# Patient Record
Sex: FEMALE | Race: WHITE | NOT HISPANIC OR LATINO | URBAN - NONMETROPOLITAN AREA
[De-identification: names, ages, dates, MRNs, and addresses within clinical notes are randomized per-mention and may not be internally consistent; named-entity substitution may affect disease eponyms.]

---

## 2020-07-01 ENCOUNTER — OFFICE (OUTPATIENT)
Dept: URBAN - NONMETROPOLITAN AREA CLINIC 13 | Facility: CLINIC | Age: 30
End: 2020-07-01

## 2020-07-01 VITALS
HEIGHT: 65 IN | SYSTOLIC BLOOD PRESSURE: 126 MMHG | OXYGEN SATURATION: 98 % | DIASTOLIC BLOOD PRESSURE: 81 MMHG | RESPIRATION RATE: 18 BRPM | HEART RATE: 53 BPM | WEIGHT: 149 LBS

## 2020-07-01 DIAGNOSIS — K59.00 CONSTIPATION, UNSPECIFIED: ICD-10-CM

## 2020-07-01 PROCEDURE — 99203 OFFICE O/P NEW LOW 30 MIN: CPT | Performed by: NURSE PRACTITIONER

## 2020-07-01 RX ORDER — LINACLOTIDE 72 UG/1
CAPSULE, GELATIN COATED ORAL
Qty: 90 | Refills: 1 | Status: COMPLETED
Start: 2020-07-01 | End: 2020-07-23

## 2022-11-28 ENCOUNTER — TELEPHONE (OUTPATIENT)
Dept: OBSTETRICS AND GYNECOLOGY | Facility: CLINIC | Age: 32
End: 2022-11-28

## 2022-12-21 ENCOUNTER — INITIAL PRENATAL (OUTPATIENT)
Dept: OBSTETRICS AND GYNECOLOGY | Facility: CLINIC | Age: 32
End: 2022-12-21

## 2022-12-21 VITALS
BODY MASS INDEX: 28.99 KG/M2 | SYSTOLIC BLOOD PRESSURE: 120 MMHG | DIASTOLIC BLOOD PRESSURE: 70 MMHG | HEIGHT: 65 IN | WEIGHT: 174 LBS

## 2022-12-21 DIAGNOSIS — O26.899 PREGNANCY RELATED NAUSEA, ANTEPARTUM: ICD-10-CM

## 2022-12-21 DIAGNOSIS — Z78.9 NON-SMOKER: ICD-10-CM

## 2022-12-21 DIAGNOSIS — Z34.01 PRENATAL CARE, FIRST PREGNANCY, FIRST TRIMESTER: Primary | ICD-10-CM

## 2022-12-21 DIAGNOSIS — Z71.85 IMMUNIZATION COUNSELING: ICD-10-CM

## 2022-12-21 DIAGNOSIS — R11.0 PREGNANCY RELATED NAUSEA, ANTEPARTUM: ICD-10-CM

## 2022-12-21 PROBLEM — Z34.00 PRIMIGRAVIDA: Status: ACTIVE | Noted: 2022-12-21

## 2022-12-21 PROCEDURE — 0501F PRENATAL FLOW SHEET: CPT | Performed by: ADVANCED PRACTICE MIDWIFE

## 2022-12-21 RX ORDER — SERTRALINE HYDROCHLORIDE 25 MG/1
25 TABLET, FILM COATED ORAL DAILY
COMMUNITY
End: 2023-02-14 | Stop reason: SDUPTHER

## 2022-12-21 RX ORDER — DOCUSATE SODIUM 250 MG
CAPSULE ORAL EVERY 24 HOURS
COMMUNITY

## 2022-12-21 RX ORDER — HYDROXYZINE HYDROCHLORIDE 10 MG/1
TABLET, FILM COATED ORAL
COMMUNITY

## 2022-12-21 RX ORDER — FOLIC ACID 1 MG/1
1000 TABLET ORAL EVERY MORNING
COMMUNITY
Start: 2022-12-13

## 2022-12-21 RX ORDER — PRENATAL WITH FERROUS FUM AND FOLIC ACID 3080; 920; 120; 400; 22; 1.84; 3; 20; 10; 1; 12; 200; 27; 25; 2 [IU]/1; [IU]/1; MG/1; [IU]/1; MG/1; MG/1; MG/1; MG/1; MG/1; MG/1; UG/1; MG/1; MG/1; MG/1; MG/1
TABLET ORAL EVERY 24 HOURS
COMMUNITY

## 2022-12-21 RX ORDER — FOLIC ACID 0.8 MG
TABLET ORAL
COMMUNITY

## 2022-12-21 RX ORDER — ONDANSETRON HYDROCHLORIDE 8 MG/1
TABLET, FILM COATED ORAL
COMMUNITY
Start: 2022-12-13 | End: 2023-01-20

## 2022-12-29 LAB
ABO GROUP BLD: NORMAL
BACTERIA UR CULT: NORMAL
BACTERIA UR CULT: NORMAL
BASOPHILS # BLD AUTO: 0.02 10*3/MM3 (ref 0–0.2)
BASOPHILS NFR BLD AUTO: 0.4 % (ref 0–1.5)
BLD GP AB SCN SERPL QL: NEGATIVE
C TRACH RRNA SPEC QL NAA+PROBE: NEGATIVE
DRUGS UR: NORMAL
EOSINOPHIL # BLD AUTO: 0.07 10*3/MM3 (ref 0–0.4)
EOSINOPHIL NFR BLD AUTO: 1.3 % (ref 0.3–6.2)
ERYTHROCYTE [DISTWIDTH] IN BLOOD BY AUTOMATED COUNT: 13.5 % (ref 12.3–15.4)
HBV SURFACE AG SERPL QL IA: NEGATIVE
HCT VFR BLD AUTO: 37 % (ref 34–46.6)
HCV AB S/CO SERPL IA: <0.1 S/CO RATIO (ref 0–0.9)
HGB BLD-MCNC: 12.2 G/DL (ref 12–15.9)
HIV 1+2 AB+HIV1 P24 AG SERPL QL IA: NON REACTIVE
IMM GRANULOCYTES # BLD AUTO: 0.03 10*3/MM3 (ref 0–0.05)
IMM GRANULOCYTES NFR BLD AUTO: 0.5 % (ref 0–0.5)
LYMPHOCYTES # BLD AUTO: 1.46 10*3/MM3 (ref 0.7–3.1)
LYMPHOCYTES NFR BLD AUTO: 26.4 % (ref 19.6–45.3)
MCH RBC QN AUTO: 29.1 PG (ref 26.6–33)
MCHC RBC AUTO-ENTMCNC: 33 G/DL (ref 31.5–35.7)
MCV RBC AUTO: 88.3 FL (ref 79–97)
MONOCYTES # BLD AUTO: 0.38 10*3/MM3 (ref 0.1–0.9)
MONOCYTES NFR BLD AUTO: 6.9 % (ref 5–12)
N GONORRHOEA RRNA SPEC QL NAA+PROBE: NEGATIVE
NEUTROPHILS # BLD AUTO: 3.57 10*3/MM3 (ref 1.7–7)
NEUTROPHILS NFR BLD AUTO: 64.5 % (ref 42.7–76)
NRBC BLD AUTO-RTO: 0.2 /100 WBC (ref 0–0.2)
PLATELET # BLD AUTO: 239 10*3/MM3 (ref 140–450)
RBC # BLD AUTO: 4.19 10*6/MM3 (ref 3.77–5.28)
RH BLD: POSITIVE
RPR SER QL: NON REACTIVE
RUBV IGG SERPL IA-ACNC: 3.24 INDEX
WBC # BLD AUTO: 5.53 10*3/MM3 (ref 3.4–10.8)

## 2023-01-20 ENCOUNTER — ROUTINE PRENATAL (OUTPATIENT)
Dept: OBSTETRICS AND GYNECOLOGY | Facility: CLINIC | Age: 33
End: 2023-01-20
Payer: COMMERCIAL

## 2023-01-20 VITALS — SYSTOLIC BLOOD PRESSURE: 132 MMHG | DIASTOLIC BLOOD PRESSURE: 80 MMHG | WEIGHT: 172 LBS | BODY MASS INDEX: 28.62 KG/M2

## 2023-01-20 DIAGNOSIS — Z34.02 ENCOUNTER FOR SUPERVISION OF NORMAL FIRST PREGNANCY IN SECOND TRIMESTER: Primary | ICD-10-CM

## 2023-01-20 PROCEDURE — 0502F SUBSEQUENT PRENATAL CARE: CPT | Performed by: OBSTETRICS & GYNECOLOGY

## 2023-01-20 RX ORDER — METOCLOPRAMIDE 10 MG/1
10 TABLET ORAL 3 TIMES DAILY PRN
Qty: 30 TABLET | Refills: 2 | Status: SHIPPED | OUTPATIENT
Start: 2023-01-20

## 2023-01-20 NOTE — PROGRESS NOTES
Feeling well, no nausea but having daily headaches  Reviewed normal prenatal labs  Rx Reglan for headaches  Discussed ffDNA and maternal carrier screening    Diagnoses and all orders for this visit:    1. Encounter for supervision of normal first pregnancy in second trimester (Primary)  -     metoclopramide (Reglan) 10 MG tablet; Take 1 tablet by mouth 3 (Three) Times a Day As Needed (nausea or headache).  Dispense: 30 tablet; Refill: 2

## 2023-02-02 ENCOUNTER — TELEPHONE (OUTPATIENT)
Dept: OBSTETRICS AND GYNECOLOGY | Facility: CLINIC | Age: 33
End: 2023-02-02
Payer: COMMERCIAL

## 2023-02-02 NOTE — TELEPHONE ENCOUNTER
Pt left a VM on the nurse line that she was calling for advice d/t numbness on right side of her body.  Pt reports numbness in face, hand and down to foot on right side.  Attempted to call pt but no answer.  I did leave a VM for pt to go to ER and return my call to the office.

## 2023-02-14 ENCOUNTER — ROUTINE PRENATAL (OUTPATIENT)
Dept: OBSTETRICS AND GYNECOLOGY | Facility: CLINIC | Age: 33
End: 2023-02-14
Payer: COMMERCIAL

## 2023-02-14 VITALS — DIASTOLIC BLOOD PRESSURE: 70 MMHG | SYSTOLIC BLOOD PRESSURE: 116 MMHG | BODY MASS INDEX: 28.62 KG/M2 | WEIGHT: 172 LBS

## 2023-02-14 DIAGNOSIS — Z78.9 NON-SMOKER: ICD-10-CM

## 2023-02-14 DIAGNOSIS — O99.340 ANXIETY IN PREGNANCY, ANTEPARTUM: ICD-10-CM

## 2023-02-14 DIAGNOSIS — F41.9 ANXIETY IN PREGNANCY, ANTEPARTUM: ICD-10-CM

## 2023-02-14 DIAGNOSIS — O26.899 HEADACHE IN PREGNANCY, ANTEPARTUM: ICD-10-CM

## 2023-02-14 DIAGNOSIS — R51.9 HEADACHE IN PREGNANCY, ANTEPARTUM: ICD-10-CM

## 2023-02-14 DIAGNOSIS — Z34.02 PRENATAL CARE, FIRST PREGNANCY, SECOND TRIMESTER: Primary | ICD-10-CM

## 2023-02-14 LAB
GLUCOSE UR STRIP-MCNC: NEGATIVE MG/DL
PROT UR STRIP-MCNC: NEGATIVE MG/DL

## 2023-02-14 PROCEDURE — 0502F SUBSEQUENT PRENATAL CARE: CPT | Performed by: ADVANCED PRACTICE MIDWIFE

## 2023-02-14 RX ORDER — PROMETHAZINE HYDROCHLORIDE 12.5 MG/1
1 TABLET ORAL
COMMUNITY
Start: 2023-02-02

## 2023-02-14 RX ORDER — ONDANSETRON HYDROCHLORIDE 8 MG/1
TABLET, FILM COATED ORAL
COMMUNITY
Start: 2023-02-02

## 2023-02-14 RX ORDER — SERTRALINE HYDROCHLORIDE 25 MG/1
25 TABLET, FILM COATED ORAL DAILY
Qty: 30 TABLET | Refills: 6 | Status: SHIPPED | OUTPATIENT
Start: 2023-02-14

## 2023-02-14 RX ORDER — CHOLECALCIFEROL (VITD3)/VIT K2 137.5-2
1 TABLET ORAL EVERY MORNING
COMMUNITY
Start: 2023-02-02

## 2023-02-14 NOTE — PROGRESS NOTES
Reason for visit: Routine OB visit at 15w6d     CC:  32-yr old  here for routine OBV at 15w6d.  HUAN 2023, by Ultrasound.  Patient reports she has had improvement with the nausea, but she is noticing more headaches. Denies VB, LOF, contractions, or other concerns. Also denies h/a, visual disturbances, and epigastric pain.     ROS: All systems reviewed and are negative with exception of the following: migraines, anxious    Wt 172 lb for a TWG of -0.907 kg (-2 lb), /70, FHTs 150  Urine today and reviewed: negative glucose, negative protein    20-week Anatomy Scan: scheduled for 2023    Exam:  General Appearance:  Healthy appearing . Normal mood and behavior.  HEENT: NCAT, EOMI  HR str and reg. Lungs clear. Resp even and unlabored.  Abdomen is soft and nontender. Bowel sounds active. Uterus is consistent with EGA  Ext: no edema, nontender, no trauma, or cyanosis.    Impression  Diagnoses and all orders for this visit:    1. Prenatal care, first pregnancy, second trimester (Primary)  - Discussed second trimester of pregnancy, discomforts and measures of support, fetal movement, pelvic pain warnings, bleeding warnings, and signs and symptoms to report. Second Trimester of Pregnancy video included in the AVS. Round Ligament Pain education included in the AVS.  - Discussed ffDNA and carrier screening results with patient.  - Discussed and encouraged to call or come to the hospital with vaginal bleeding, leaking of fluid, pelvic pain, or any other concerns.  - Return to the office in 4 weeks for a routine OB visit with Dr. Adams and as needed with concerns.    2. Anxiety in pregnancy, antepartum  - Anxiety has been managed with Zoloft and patient in need of new prescription. Has a healthy support system.   -     sertraline (ZOLOFT) 25 MG tablet; Take 1 tablet by mouth Daily.  Dispense: 30 tablet; Refill: 6    3. Headache in pregnancy, antepartum  - Discussed measures of support for headaches,  including Tylenol, magnesium, continuing Reglan, massage, aromatherapy, heat/cold. Patient to notify provider if headaches persist or increase.     4. Non-smoker          This note has been signed electronically.    Tayler Chahal DNP, APRN, CNM, RNC-OB

## 2023-03-14 ENCOUNTER — ROUTINE PRENATAL (OUTPATIENT)
Dept: OBSTETRICS AND GYNECOLOGY | Facility: CLINIC | Age: 33
End: 2023-03-14
Payer: COMMERCIAL

## 2023-03-14 VITALS — WEIGHT: 179 LBS | DIASTOLIC BLOOD PRESSURE: 72 MMHG | SYSTOLIC BLOOD PRESSURE: 120 MMHG | BODY MASS INDEX: 29.79 KG/M2

## 2023-03-14 DIAGNOSIS — O26.892: Primary | ICD-10-CM

## 2023-03-14 DIAGNOSIS — R51.9: Primary | ICD-10-CM

## 2023-03-14 LAB
GLUCOSE UR STRIP-MCNC: NEGATIVE MG/DL
PROT UR STRIP-MCNC: NEGATIVE MG/DL

## 2023-03-14 PROCEDURE — 0502F SUBSEQUENT PRENATAL CARE: CPT | Performed by: OBSTETRICS & GYNECOLOGY

## 2023-03-14 RX ORDER — LABETALOL 100 MG/1
100 TABLET, FILM COATED ORAL 2 TIMES DAILY
Qty: 30 TABLET | Refills: 2 | Status: SHIPPED | OUTPATIENT
Start: 2023-03-14

## 2023-03-14 NOTE — PROGRESS NOTES
Still having migraine headaches, every 1-2 days  Reviewed normal prenatal labs  Rx Labetalol 100mg BID for migraine prophylaxis  Has anatomy US later today    Diagnoses and all orders for this visit:    1. Pregnancy headache, antepartum, second trimester (Primary)

## 2023-03-17 RX ORDER — BUPROPION HYDROCHLORIDE 150 MG/1
150 TABLET ORAL EVERY MORNING
Qty: 30 TABLET | Refills: 2 | Status: SHIPPED | OUTPATIENT
Start: 2023-03-17 | End: 2024-03-16

## 2023-03-17 NOTE — TELEPHONE ENCOUNTER
Pharmacy has sent a paper request to refill Wellbutrin XL 150mg once daily. Per last my chart message with darby Lester to take wellbutrin and zoloft together. See pended refills. Pt is 20w2d.

## 2023-04-11 ENCOUNTER — ROUTINE PRENATAL (OUTPATIENT)
Dept: OBSTETRICS AND GYNECOLOGY | Facility: CLINIC | Age: 33
End: 2023-04-11
Payer: COMMERCIAL

## 2023-04-11 VITALS — DIASTOLIC BLOOD PRESSURE: 80 MMHG | BODY MASS INDEX: 29.62 KG/M2 | WEIGHT: 178 LBS | SYSTOLIC BLOOD PRESSURE: 122 MMHG

## 2023-04-11 DIAGNOSIS — O99.613 CONSTIPATION DURING PREGNANCY IN THIRD TRIMESTER: ICD-10-CM

## 2023-04-11 DIAGNOSIS — Z78.9 NON-SMOKER: ICD-10-CM

## 2023-04-11 DIAGNOSIS — K59.00 CONSTIPATION DURING PREGNANCY IN THIRD TRIMESTER: ICD-10-CM

## 2023-04-11 DIAGNOSIS — Z3A.23 23 WEEKS GESTATION OF PREGNANCY: ICD-10-CM

## 2023-04-11 DIAGNOSIS — Z34.02 PRIMIGRAVIDA, SECOND TRIMESTER: Primary | ICD-10-CM

## 2023-04-11 PROBLEM — O35.8XX0 UMBILICAL VEIN ABNORMALITY AFFECTING PREGNANCY: Status: ACTIVE | Noted: 2023-03-14

## 2023-04-11 LAB
GLUCOSE UR STRIP-MCNC: NEGATIVE MG/DL
PROT UR STRIP-MCNC: NEGATIVE MG/DL

## 2023-04-11 PROCEDURE — 0502F SUBSEQUENT PRENATAL CARE: CPT | Performed by: ADVANCED PRACTICE MIDWIFE

## 2023-04-11 RX ORDER — POLYETHYLENE GLYCOL 3350 17 G/17G
17 POWDER, FOR SOLUTION ORAL DAILY
COMMUNITY

## 2023-04-11 NOTE — PROGRESS NOTES
Reason for visit: Routine OB visit at 23w6d. She is accompanied by her mother    CC:  Patient reports feeling fetal movement. She has not had headaches since starting a regiment of Vitamin B12, Magnesium, and CoQ10. She has been having some mild cramping, but she believes this is from her chronic constipation. Denies VB, LOF, contractions, h/a, visual changes, and right upper quadrant pain.     ROS: All systems reviewed and are negative with exception of the following: constipation    Wt 178 lb for a TWG of 1.814 kg (4 lb), /80, FHTs 137  Urine today and reviewed: negative glucose, negative protein    19-weeks Anatomy scan: normal; anterior placenta without evidence of placenta previa    Exam:  General Appearance:  Healthy appearing . Normal mood and behavior.  HEENT: NCAT, EOMI  HR str and reg. Lungs clear. Resp even and unlabored.  Abdomen is soft and nontender. Uterus is consistent with EGA  Ext: no edema, nontender, no trauma, or cyanosis.    Impression  Diagnoses and all orders for this visit:    1. Primigravida, second trimester (Primary)  - Discussed second trimester of pregnancy, discomforts and measures of support, fetal movement,  labor warnings, preeclampsia warnings, and signs and symptoms to report. Second Trimester of Pregnancy video and Round Ligament Pain education included in the AVS.  - Discussed plan for glucose tolerance test and hemoglobin for the next visit.  - Discussed and encouraged to call or come to the hospital for vaginal bleeding, leaking of fluid, contractions, or any other concerns.  - Return to the office in four weeks with Dr. Adams with 4D ultrasound and as needed with concerns.    2. 23 weeks gestation of pregnancy    3. Constipation during pregnancy in third trimester  - Discussed measures of support, including increased hydration with water, increased fiber intake including fruits and vegetables, OTC options such as Metamucil or Benefiber and Colace.  Constipation education included in the AVS. Patient to notify provider if symptoms increase or persist.     4. Non-smoker          This note has been signed electronically.    Tayler Chahal DNP, APRN, CNM, RNC-OB

## 2023-04-14 ENCOUNTER — TELEPHONE (OUTPATIENT)
Dept: OBSTETRICS AND GYNECOLOGY | Facility: CLINIC | Age: 33
End: 2023-04-14
Payer: COMMERCIAL

## 2023-04-14 NOTE — TELEPHONE ENCOUNTER
Called and informed pt that she needs to d/c Reglan.  I called and spoke to Guzman and informed her of this, she voiced understanding.

## 2023-04-14 NOTE — TELEPHONE ENCOUNTER
Guzman from [facility] called and said there is a drug-drug interaction with Reglan and Wellbutrin that is EPS effects, serotonin syndome and dyskinesia.  Do you want pt to continue on both or d/c Reglan if she wants to stay on Wellbutrin?

## 2023-05-11 ENCOUNTER — ROUTINE PRENATAL (OUTPATIENT)
Dept: OBSTETRICS AND GYNECOLOGY | Facility: CLINIC | Age: 33
End: 2023-05-11
Payer: COMMERCIAL

## 2023-05-11 VITALS — SYSTOLIC BLOOD PRESSURE: 108 MMHG | DIASTOLIC BLOOD PRESSURE: 64 MMHG | BODY MASS INDEX: 30.62 KG/M2 | WEIGHT: 184 LBS

## 2023-05-11 DIAGNOSIS — Z34.03 ENCOUNTER FOR SUPERVISION OF NORMAL FIRST PREGNANCY IN THIRD TRIMESTER: Primary | ICD-10-CM

## 2023-05-11 LAB
GLUCOSE UR STRIP-MCNC: NEGATIVE MG/DL
PROT UR STRIP-MCNC: NEGATIVE MG/DL

## 2023-05-11 NOTE — PROGRESS NOTES
Good fetal movement  Reviewed normal anatomy ultrasound  Glucola and Hgb today, Rh positive  Discuss Tdap next visit  Discussed Nick Pagan contractions    Diagnoses and all orders for this visit:    1. Encounter for supervision of normal first pregnancy in third trimester (Primary)  -     Gestational Screen 1 Hr ([facility])  -     Hemoglobin

## 2023-05-12 LAB
GLUCOSE 1H P 50 G GLC PO SERPL-MCNC: 130 MG/DL (ref 65–139)
HGB BLD-MCNC: 11.6 G/DL (ref 12–15.9)

## 2023-05-24 ENCOUNTER — HOSPITAL ENCOUNTER (OUTPATIENT)
Facility: HOSPITAL | Age: 33
Discharge: HOME | End: 2023-05-24
Attending: OBSTETRICS & GYNECOLOGY | Admitting: OBSTETRICS & GYNECOLOGY
Payer: COMMERCIAL

## 2023-05-24 ENCOUNTER — TELEPHONE (OUTPATIENT)
Dept: OBSTETRICS AND GYNECOLOGY | Facility: CLINIC | Age: 33
End: 2023-05-24
Payer: COMMERCIAL

## 2023-05-24 VITALS
BODY MASS INDEX: 31.16 KG/M2 | RESPIRATION RATE: 16 BRPM | SYSTOLIC BLOOD PRESSURE: 112 MMHG | DIASTOLIC BLOOD PRESSURE: 68 MMHG | OXYGEN SATURATION: 99 % | TEMPERATURE: 97.8 F | WEIGHT: 187 LBS | HEIGHT: 65 IN | HEART RATE: 69 BPM

## 2023-05-24 PROBLEM — Z34.90 PREGNANCY: Status: ACTIVE | Noted: 2023-05-24

## 2023-05-24 PROCEDURE — G0463 HOSPITAL OUTPT CLINIC VISIT: HCPCS

## 2023-05-24 PROCEDURE — G0378 HOSPITAL OBSERVATION PER HR: HCPCS

## 2023-05-24 RX ORDER — FAMOTIDINE 10 MG
10 TABLET ORAL 2 TIMES DAILY
COMMUNITY

## 2023-05-24 NOTE — TELEPHONE ENCOUNTER
----- Message from Megan URIOSTEGUI sent at 5/24/2023 10:36 AM CDT -----  Regarding: Not feeling baby move  Contact: [phone]  My baby is usually super active from about 6am on and I haven’t felt him at all today and am super worried. I have an appt tomorrow but wondering what I need to do?

## 2023-05-24 NOTE — TELEPHONE ENCOUNTER
I called patient to discuss baby's movement further. Pt reports she has eaten her normal breakfast and has eaten candy, laid on her left side and pushed around on her belly to elicit movement but has not felt anything. Spoke with Dr Norris's nurse about his preference to bring patient to office or send to [facility]. Pt advised to go to LDR for evaluation and voiced understanding.

## 2023-05-25 ENCOUNTER — ROUTINE PRENATAL (OUTPATIENT)
Dept: OBSTETRICS AND GYNECOLOGY | Facility: CLINIC | Age: 33
End: 2023-05-25

## 2023-05-25 VITALS — WEIGHT: 186 LBS | DIASTOLIC BLOOD PRESSURE: 70 MMHG | SYSTOLIC BLOOD PRESSURE: 126 MMHG | BODY MASS INDEX: 30.95 KG/M2

## 2023-05-25 DIAGNOSIS — Z78.9 NON-SMOKER: ICD-10-CM

## 2023-05-25 DIAGNOSIS — Z23 NEED FOR TDAP VACCINATION: ICD-10-CM

## 2023-05-25 DIAGNOSIS — O35.8XX0 UMBILICAL VEIN ABNORMALITY AFFECTING PREGNANCY, SINGLE OR UNSPECIFIED FETUS: ICD-10-CM

## 2023-05-25 DIAGNOSIS — Z3A.30 30 WEEKS GESTATION OF PREGNANCY: ICD-10-CM

## 2023-05-25 DIAGNOSIS — Z71.85 IMMUNIZATION COUNSELING: ICD-10-CM

## 2023-05-25 DIAGNOSIS — Z34.03 PRIMIGRAVIDA, THIRD TRIMESTER: Primary | ICD-10-CM

## 2023-05-25 LAB
GLUCOSE UR STRIP-MCNC: NEGATIVE MG/DL
PROT UR STRIP-MCNC: NEGATIVE MG/DL

## 2023-05-25 PROCEDURE — 0502F SUBSEQUENT PRENATAL CARE: CPT | Performed by: ADVANCED PRACTICE MIDWIFE

## 2023-05-25 PROCEDURE — 90471 IMMUNIZATION ADMIN: CPT | Performed by: ADVANCED PRACTICE MIDWIFE

## 2023-05-25 PROCEDURE — 90715 TDAP VACCINE 7 YRS/> IM: CPT | Performed by: ADVANCED PRACTICE MIDWIFE

## 2023-05-25 NOTE — PROGRESS NOTES
Reason for visit: Routine OB visit at 30w1d     CC:  Patient reports she questions the amount of fetal movement. Yesterday, she was seen in LDR for decreased movement. Once she was on the monitor, they said the baby was moving regularly, but she does not feel it like they do. Denies VB, LOF, contractions, h/a, visual changes, and right upper quadrant pain.     ROS: All systems reviewed and are negative with exception of the following: anxious    Wt 186 lb for a TWG of 5.443 kg (12 lb), /70, FHTs 137, FH 30 cm  Urine today and reviewed: negative glucose, negative protein    19-week Anatomy scan: normal; anterior placenta without evidence of placenta previa    Exam:  General Appearance:  Healthy appearing . Normal mood and behavior.  HEENT: NCAT, EOMI  HR str and reg. Lungs clear. Resp even and unlabored.  Abdomen is soft and nontender. Uterus is consistent with EGA  Ext: no edema, nontender, no trauma, or cyanosis.    Impression  Diagnoses and all orders for this visit:    1. Primigravida, third trimester (Primary)  - Discussed third trimester of pregnancy, discomforts and measures of support, fetal movement counts,  labor warnings, preeclampsia warnings, and signs and symptoms to report. Third Trimester of Pregnancy video included in the AVS.  - Reviewed glucose tolerance test and hemoglobin results with patient.  - Discussed and encouraged to come to the hospital or call with vaginal bleeding, leaking of fluid, contractions, or other concerns.  - Return to the office in two weeks for routine OBV with Dr. Adams and as needed with concerns.    2. 30 weeks gestation of pregnancy  - Discussed and encouraged childbirth education classes and lactation  classes through the Maternal-Child Department's community education programs.     3. Immunization counseling  - Discussed Tdap immunization recommendations during pregnancy. Patient consents to receive the Tdap immunization during this  clinic visit. Tdap (Tetanus, Diptheria, Pertussis) Vaccine: What You Need to Know (VIS) education included in the AVS.    4. Need for Tdap vaccination  -     Tdap Vaccine Greater Than or Equal To 6yo IM    5. Umbilical vein abnormality affecting pregnancy, single or unspecified fetus  - Persistent right umbilical vein noted an 20-week anatomy scan. She is anxious with this finding. Will plan for interval growth at next visit secondary to previously noted persistent right umbilical vein.     6. Non-smoker          This note has been signed electronically.    Tayler Chahal, DNP, APRN, CNM, RNC-OB

## 2023-06-13 ENCOUNTER — ROUTINE PRENATAL (OUTPATIENT)
Dept: OBSTETRICS AND GYNECOLOGY | Facility: CLINIC | Age: 33
End: 2023-06-13
Payer: COMMERCIAL

## 2023-06-13 VITALS — SYSTOLIC BLOOD PRESSURE: 118 MMHG | BODY MASS INDEX: 31.62 KG/M2 | DIASTOLIC BLOOD PRESSURE: 74 MMHG | WEIGHT: 190 LBS

## 2023-06-13 DIAGNOSIS — Z34.03 ENCOUNTER FOR SUPERVISION OF NORMAL FIRST PREGNANCY IN THIRD TRIMESTER: ICD-10-CM

## 2023-06-13 DIAGNOSIS — Z3A.32 32 WEEKS GESTATION OF PREGNANCY: ICD-10-CM

## 2023-06-13 DIAGNOSIS — Z34.03 PRIMIGRAVIDA, THIRD TRIMESTER: Primary | ICD-10-CM

## 2023-06-13 LAB
GLUCOSE UR STRIP-MCNC: NEGATIVE MG/DL
PROT UR STRIP-MCNC: NEGATIVE MG/DL

## 2023-06-13 PROCEDURE — 0502F SUBSEQUENT PRENATAL CARE: CPT | Performed by: OBSTETRICS & GYNECOLOGY

## 2023-06-13 NOTE — PROGRESS NOTES
Good fetal movement  No contractions, taking Pepcid complete for reflux  Reviewed normal Glucola and Hgb  US today 75% growth, GLORIA 20cm   labor precautions    Diagnoses and all orders for this visit:    1. Primigravida, third trimester (Primary)  -     POC Urinalysis Dipstick    2. 32 weeks gestation of pregnancy    3. Encounter for supervision of normal first pregnancy in third trimester

## 2023-07-25 ENCOUNTER — ROUTINE PRENATAL (OUTPATIENT)
Dept: OBSTETRICS AND GYNECOLOGY | Facility: CLINIC | Age: 33
End: 2023-07-25
Payer: COMMERCIAL

## 2023-07-25 VITALS — WEIGHT: 201 LBS | BODY MASS INDEX: 33.45 KG/M2 | DIASTOLIC BLOOD PRESSURE: 84 MMHG | SYSTOLIC BLOOD PRESSURE: 132 MMHG

## 2023-07-25 DIAGNOSIS — Z34.03 ENCOUNTER FOR SUPERVISION OF NORMAL FIRST PREGNANCY IN THIRD TRIMESTER: Primary | ICD-10-CM

## 2023-07-25 PROCEDURE — 0502F SUBSEQUENT PRENATAL CARE: CPT | Performed by: OBSTETRICS & GYNECOLOGY

## 2023-07-25 NOTE — PROGRESS NOTES
Good fetal movement  No contractions but feeling more pelvic pressure  Cervix fingertip, 50, mid position  Reviewed GBS negative  Labor instructions    Diagnoses and all orders for this visit:    1. Encounter for supervision of normal first pregnancy in third trimester (Primary)

## 2023-08-01 ENCOUNTER — ROUTINE PRENATAL (OUTPATIENT)
Dept: OBSTETRICS AND GYNECOLOGY | Facility: CLINIC | Age: 33
End: 2023-08-01
Payer: COMMERCIAL

## 2023-08-01 VITALS — DIASTOLIC BLOOD PRESSURE: 86 MMHG | BODY MASS INDEX: 33.12 KG/M2 | WEIGHT: 199 LBS | SYSTOLIC BLOOD PRESSURE: 138 MMHG

## 2023-08-01 DIAGNOSIS — Z34.03 ENCOUNTER FOR SUPERVISION OF NORMAL FIRST PREGNANCY IN THIRD TRIMESTER: Primary | ICD-10-CM

## 2023-08-01 PROCEDURE — 0502F SUBSEQUENT PRENATAL CARE: CPT | Performed by: OBSTETRICS & GYNECOLOGY

## 2023-08-02 ENCOUNTER — PREP FOR SURGERY (OUTPATIENT)
Dept: OTHER | Facility: HOSPITAL | Age: 33
End: 2023-08-02
Payer: COMMERCIAL

## 2023-08-02 DIAGNOSIS — Z3A.40 40 WEEKS GESTATION OF PREGNANCY: Primary | ICD-10-CM

## 2023-08-02 RX ORDER — MISOPROSTOL 200 UG/1
800 TABLET ORAL AS NEEDED
OUTPATIENT
Start: 2023-08-02

## 2023-08-02 RX ORDER — MORPHINE SULFATE 1 MG/ML
1 INJECTION, SOLUTION EPIDURAL; INTRATHECAL; INTRAVENOUS EVERY 4 HOURS PRN
Status: CANCELLED | OUTPATIENT
Start: 2023-08-02 | End: 2023-08-09

## 2023-08-02 RX ORDER — TRISODIUM CITRATE DIHYDRATE AND CITRIC ACID MONOHYDRATE 500; 334 MG/5ML; MG/5ML
30 SOLUTION ORAL ONCE AS NEEDED
Status: CANCELLED | OUTPATIENT
Start: 2023-08-02

## 2023-08-02 RX ORDER — MISOPROSTOL 100 MCG
25 TABLET ORAL
Status: CANCELLED | OUTPATIENT
Start: 2023-08-02 | End: 2023-08-03

## 2023-08-02 RX ORDER — ONDANSETRON 2 MG/ML
4 INJECTION INTRAMUSCULAR; INTRAVENOUS EVERY 6 HOURS PRN
Status: CANCELLED | OUTPATIENT
Start: 2023-08-02

## 2023-08-02 RX ORDER — OXYTOCIN/0.9 % SODIUM CHLORIDE 30/500 ML
250 PLASTIC BAG, INJECTION (ML) INTRAVENOUS CONTINUOUS
OUTPATIENT
Start: 2023-08-02 | End: 2023-08-02

## 2023-08-02 RX ORDER — METHYLERGONOVINE MALEATE 0.2 MG/ML
200 INJECTION INTRAVENOUS ONCE AS NEEDED
OUTPATIENT
Start: 2023-08-02

## 2023-08-02 RX ORDER — SODIUM CHLORIDE, SODIUM LACTATE, POTASSIUM CHLORIDE, CALCIUM CHLORIDE 600; 310; 30; 20 MG/100ML; MG/100ML; MG/100ML; MG/100ML
125 INJECTION, SOLUTION INTRAVENOUS CONTINUOUS
Status: CANCELLED | OUTPATIENT
Start: 2023-08-02

## 2023-08-02 RX ORDER — NALOXONE HCL 0.4 MG/ML
0.4 VIAL (ML) INJECTION
Status: CANCELLED | OUTPATIENT
Start: 2023-08-02

## 2023-08-02 RX ORDER — MORPHINE SULFATE 2 MG/ML
2 INJECTION, SOLUTION INTRAMUSCULAR; INTRAVENOUS EVERY 4 HOURS PRN
Status: CANCELLED | OUTPATIENT
Start: 2023-08-02 | End: 2023-08-09

## 2023-08-02 RX ORDER — CALCIUM CARBONATE 500 MG/1
2 TABLET, CHEWABLE ORAL 3 TIMES DAILY PRN
OUTPATIENT
Start: 2023-08-02

## 2023-08-02 RX ORDER — TERBUTALINE SULFATE 1 MG/ML
0.25 INJECTION, SOLUTION SUBCUTANEOUS AS NEEDED
Status: CANCELLED | OUTPATIENT
Start: 2023-08-02

## 2023-08-02 RX ORDER — ACETAMINOPHEN 325 MG/1
650 TABLET ORAL EVERY 4 HOURS PRN
Status: CANCELLED | OUTPATIENT
Start: 2023-08-02

## 2023-08-02 RX ORDER — ONDANSETRON 4 MG/1
4 TABLET, FILM COATED ORAL EVERY 6 HOURS PRN
OUTPATIENT
Start: 2023-08-02

## 2023-08-02 RX ORDER — IBUPROFEN 600 MG/1
600 TABLET ORAL EVERY 6 HOURS PRN
OUTPATIENT
Start: 2023-08-02

## 2023-08-02 RX ORDER — CARBOPROST TROMETHAMINE 250 UG/ML
250 INJECTION, SOLUTION INTRAMUSCULAR AS NEEDED
OUTPATIENT
Start: 2023-08-02

## 2023-08-02 RX ORDER — ONDANSETRON 4 MG/1
4 TABLET, FILM COATED ORAL EVERY 6 HOURS PRN
Status: CANCELLED | OUTPATIENT
Start: 2023-08-02

## 2023-08-02 RX ORDER — OXYTOCIN/0.9 % SODIUM CHLORIDE 30/500 ML
999 PLASTIC BAG, INJECTION (ML) INTRAVENOUS ONCE
OUTPATIENT
Start: 2023-08-02 | End: 2023-08-02

## 2023-08-02 RX ORDER — ONDANSETRON 2 MG/ML
4 INJECTION INTRAMUSCULAR; INTRAVENOUS EVERY 6 HOURS PRN
OUTPATIENT
Start: 2023-08-02

## 2023-08-02 NOTE — H&P (VIEW-ONLY)
[facility]  Megan URIOSTEGUI  : 1990  MRN: [id number]  CSN: [id number]    History and Physical    Subjective   Megan URIOSTEGUI is a 32 y.o. year old  with an Estimated Date of Delivery: 23 scheduled for induction of labor on  due to postdates.  Prenatal care has been with Dr. Tavo Adams.  It has been benign.    OB History    Para Term  AB Living   1 0 0 0 0 0   SAB IAB Ectopic Molar Multiple Live Births   0 0 0 0 0 0      # Outcome Date GA Lbr Mushtaq/2nd Weight Sex Delivery Anes PTL Lv   1 Current              Past Medical History:   Diagnosis Date    Anxiety     Zoloft    Asthma As a child    Depression     Currently take zoloft    Female infertility 2020     No past surgical history on file.    Current Outpatient Medications:     buPROPion XL (Wellbutrin XL) 150 MG 24 hr tablet, Take 1 tablet by mouth Every Morning., Disp: 30 tablet, Rfl: 2    docusate sodium (COLACE) 250 MG capsule, Daily., Disp: , Rfl:     famotidine (PEPCID) 10 MG tablet, Take 1 tablet by mouth 2 (Two) Times a Day., Disp: , Rfl:     folic acid (FOLVITE) 1 MG tablet, Take 1 tablet by mouth Every Morning., Disp: , Rfl:     hydrOXYzine (ATARAX) 10 MG tablet, TAKE 1 TABLET BY MOUTH TWICE A DAY AS NEEDED FOR ITCHING, Disp: , Rfl:     Magnesium 500 MG capsule, Take  by mouth., Disp: , Rfl:     ondansetron (ZOFRAN) 8 MG tablet, TAKE 1 TABLET BY MOUTH IN THE MORNING AND AT BEDTIME AS NEEDED FOR NAUSEA, Disp: , Rfl:     polyethylene glycol (MIRALAX) 17 GM/SCOOP powder, Take 17 g by mouth Daily., Disp: , Rfl:     Prenatal Vit-Fe Fumarate-FA (Prenatal 27-) 27-1 MG tablet tablet, Daily., Disp: , Rfl:     promethazine (PHENERGAN) 12.5 MG tablet, Take 1 tablet by mouth 6 (Six) Times a Day., Disp: , Rfl:     sertraline (ZOLOFT) 50 MG tablet, Take 1 tablet by mouth Every Morning., Disp: , Rfl:     vitamin D3 125 MCG (5000 UT) capsule capsule, Take 1 capsule by mouth Daily., Disp: , Rfl:     Allergies    Allergen Reactions    Lubiprostone Unknown - High Severity     Other reaction(s): shortness of breath       Social History    Tobacco Use      Smoking status: Never      Smokeless tobacco: Never    Review of Systems      Objective   LMP 10/25/2022   General: well developed; well nourished  no acute distress   Heart: regular rate and rhythm   Lungs: breathing is unlabored   Abdomen:  Cervix:  Presentation:  EFW by Leopold's:  EFW by recent u/s: soft, non-tender; no masses  was checked (by me): 0 cm / 40 % / -3  Vertex  7 #       Prenatal Labs  Lab Results   Component Value Date    HGB 11.6 (L) 2023    HEPBSAG Negative 2022    ABO O 2022    RH Positive 2022    ABSCRN Negative 2022    ENT8GNE8 Non Reactive 2022    HEPCVIRUSABY <0.1 2022    URINECX Final report 2022       Recent Labs  Lab Results   Component Value Date    HGB 11.6 (L) 2023    HCT 37.0 2022    WBC 5.53 2022     2022           Assessment   IUP with an Estimated Date of Delivery: 23  Induction of labor scheduled on  for postdates.  The patient's pelvis feels clinically adequate for IOL to be appropriate, although she understands that this clinical judgement is not always accurate.  Group B Strep status: negative         Plan   Risks and benefits of induction discussed.  Patient understands that IOL may increase the risk of  delivery over spontaneous labor, especially if the patient does not have a favorable cervix.  Plan Cytotec induction    Oneal Adams MD  2023

## 2023-08-06 ENCOUNTER — HOSPITAL ENCOUNTER (OUTPATIENT)
Dept: LABOR AND DELIVERY | Facility: HOSPITAL | Age: 33
Discharge: HOME | End: 2023-08-06
Payer: COMMERCIAL

## 2023-08-06 ENCOUNTER — HOSPITAL ENCOUNTER (INPATIENT)
Facility: HOSPITAL | Age: 33
LOS: 6 days | Discharge: HOME | End: 2023-08-12
Attending: OBSTETRICS & GYNECOLOGY | Admitting: OBSTETRICS & GYNECOLOGY
Payer: COMMERCIAL

## 2023-08-06 DIAGNOSIS — Z3A.40 40 WEEKS GESTATION OF PREGNANCY: ICD-10-CM

## 2023-08-06 LAB
ABO GROUP BLD: NORMAL
BASOPHILS # BLD AUTO: 0.01 10*3/MM3 (ref 0–0.2)
BASOPHILS NFR BLD AUTO: 0.1 % (ref 0–1.5)
BLD GP AB SCN SERPL QL: NEGATIVE
DEPRECATED RDW RBC AUTO: 50.6 FL (ref 37–54)
EOSINOPHIL # BLD AUTO: 0.06 10*3/MM3 (ref 0–0.4)
EOSINOPHIL NFR BLD AUTO: 0.8 % (ref 0.3–6.2)
ERYTHROCYTE [DISTWIDTH] IN BLOOD BY AUTOMATED COUNT: 15.8 % (ref 12.3–15.4)
HCT VFR BLD AUTO: 36.5 % (ref 34–46.6)
HGB BLD-MCNC: 11.8 G/DL (ref 12–15.9)
IMM GRANULOCYTES # BLD AUTO: 0.04 10*3/MM3 (ref 0–0.05)
IMM GRANULOCYTES NFR BLD AUTO: 0.6 % (ref 0–0.5)
LYMPHOCYTES # BLD AUTO: 1.41 10*3/MM3 (ref 0.7–3.1)
LYMPHOCYTES NFR BLD AUTO: 19.9 % (ref 19.6–45.3)
MCH RBC QN AUTO: 28.8 PG (ref 26.6–33)
MCHC RBC AUTO-ENTMCNC: 32.3 G/DL (ref 31.5–35.7)
MCV RBC AUTO: 89 FL (ref 79–97)
MONOCYTES # BLD AUTO: 0.52 10*3/MM3 (ref 0.1–0.9)
MONOCYTES NFR BLD AUTO: 7.3 % (ref 5–12)
NEUTROPHILS NFR BLD AUTO: 5.04 10*3/MM3 (ref 1.7–7)
NEUTROPHILS NFR BLD AUTO: 71.3 % (ref 42.7–76)
NRBC BLD AUTO-RTO: 0 /100 WBC (ref 0–0.2)
PLATELET # BLD AUTO: 182 10*3/MM3 (ref 140–450)
PMV BLD AUTO: 11.9 FL (ref 6–12)
RBC # BLD AUTO: 4.1 10*6/MM3 (ref 3.77–5.28)
RH BLD: POSITIVE
T&S EXPIRATION DATE: NORMAL
WBC NRBC COR # BLD: 7.08 10*3/MM3 (ref 3.4–10.8)

## 2023-08-06 PROCEDURE — 86850 RBC ANTIBODY SCREEN: CPT | Performed by: OBSTETRICS & GYNECOLOGY

## 2023-08-06 PROCEDURE — 86901 BLOOD TYPING SEROLOGIC RH(D): CPT | Performed by: OBSTETRICS & GYNECOLOGY

## 2023-08-06 PROCEDURE — 85025 COMPLETE CBC W/AUTO DIFF WBC: CPT | Performed by: OBSTETRICS & GYNECOLOGY

## 2023-08-06 PROCEDURE — 63710000001 DIPHENHYDRAMINE PER 50 MG: Performed by: OBSTETRICS & GYNECOLOGY

## 2023-08-06 PROCEDURE — 86900 BLOOD TYPING SEROLOGIC ABO: CPT | Performed by: OBSTETRICS & GYNECOLOGY

## 2023-08-06 RX ORDER — NALOXONE HCL 0.4 MG/ML
0.4 VIAL (ML) INJECTION
Status: DISCONTINUED | OUTPATIENT
Start: 2023-08-06 | End: 2023-08-08 | Stop reason: HOSPADM

## 2023-08-06 RX ORDER — TERBUTALINE SULFATE 1 MG/ML
0.25 INJECTION, SOLUTION SUBCUTANEOUS AS NEEDED
Status: DISCONTINUED | OUTPATIENT
Start: 2023-08-06 | End: 2023-08-08 | Stop reason: HOSPADM

## 2023-08-06 RX ORDER — MORPHINE SULFATE 2 MG/ML
2 INJECTION, SOLUTION INTRAMUSCULAR; INTRAVENOUS EVERY 4 HOURS PRN
Status: DISCONTINUED | OUTPATIENT
Start: 2023-08-06 | End: 2023-08-08 | Stop reason: HOSPADM

## 2023-08-06 RX ORDER — ONDANSETRON 2 MG/ML
4 INJECTION INTRAMUSCULAR; INTRAVENOUS EVERY 6 HOURS PRN
Status: DISCONTINUED | OUTPATIENT
Start: 2023-08-06 | End: 2023-08-08 | Stop reason: HOSPADM

## 2023-08-06 RX ORDER — BUPROPION HYDROCHLORIDE 150 MG/1
150 TABLET ORAL NIGHTLY
Status: DISCONTINUED | OUTPATIENT
Start: 2023-08-06 | End: 2023-08-11

## 2023-08-06 RX ORDER — MISOPROSTOL 100 MCG
25 TABLET ORAL
Status: COMPLETED | OUTPATIENT
Start: 2023-08-06 | End: 2023-08-07

## 2023-08-06 RX ORDER — ONDANSETRON 4 MG/1
4 TABLET, FILM COATED ORAL EVERY 6 HOURS PRN
Status: DISCONTINUED | OUTPATIENT
Start: 2023-08-06 | End: 2023-08-08 | Stop reason: HOSPADM

## 2023-08-06 RX ORDER — MORPHINE SULFATE 2 MG/ML
1 INJECTION, SOLUTION INTRAMUSCULAR; INTRAVENOUS EVERY 4 HOURS PRN
Status: DISCONTINUED | OUTPATIENT
Start: 2023-08-06 | End: 2023-08-08 | Stop reason: HOSPADM

## 2023-08-06 RX ORDER — ACETAMINOPHEN 325 MG/1
650 TABLET ORAL EVERY 4 HOURS PRN
Status: DISCONTINUED | OUTPATIENT
Start: 2023-08-06 | End: 2023-08-08 | Stop reason: HOSPADM

## 2023-08-06 RX ORDER — SODIUM CHLORIDE, SODIUM LACTATE, POTASSIUM CHLORIDE, CALCIUM CHLORIDE 600; 310; 30; 20 MG/100ML; MG/100ML; MG/100ML; MG/100ML
125 INJECTION, SOLUTION INTRAVENOUS CONTINUOUS
Status: DISCONTINUED | OUTPATIENT
Start: 2023-08-06 | End: 2023-08-11

## 2023-08-06 RX ORDER — TRISODIUM CITRATE DIHYDRATE AND CITRIC ACID MONOHYDRATE 500; 334 MG/5ML; MG/5ML
30 SOLUTION ORAL ONCE AS NEEDED
Status: DISCONTINUED | OUTPATIENT
Start: 2023-08-06 | End: 2023-08-08 | Stop reason: HOSPADM

## 2023-08-06 RX ORDER — DIPHENHYDRAMINE HCL 50 MG
50 CAPSULE ORAL NIGHTLY PRN
Status: DISCONTINUED | OUTPATIENT
Start: 2023-08-06 | End: 2023-08-11

## 2023-08-06 RX ORDER — HYDROXYZINE HYDROCHLORIDE 10 MG/1
10 TABLET, FILM COATED ORAL NIGHTLY
Status: DISCONTINUED | OUTPATIENT
Start: 2023-08-06 | End: 2023-08-11

## 2023-08-06 RX ADMIN — SERTRALINE HYDROCHLORIDE 50 MG: 50 TABLET, FILM COATED ORAL at 21:30

## 2023-08-06 RX ADMIN — DIPHENHYDRAMINE HYDROCHLORIDE 50 MG: 50 CAPSULE ORAL at 21:30

## 2023-08-06 RX ADMIN — Medication 25 MCG: at 21:30

## 2023-08-06 RX ADMIN — Medication 25 MCG: at 17:50

## 2023-08-06 RX ADMIN — HYDROXYZINE HYDROCHLORIDE 10 MG: 10 TABLET, FILM COATED ORAL at 21:30

## 2023-08-06 RX ADMIN — BUPROPION HYDROCHLORIDE 150 MG: 150 TABLET, EXTENDED RELEASE ORAL at 21:30

## 2023-08-07 ENCOUNTER — ANESTHESIA EVENT (OUTPATIENT)
Dept: LABOR AND DELIVERY | Facility: HOSPITAL | Age: 33
End: 2023-08-07
Payer: COMMERCIAL

## 2023-08-07 ENCOUNTER — ANESTHESIA (OUTPATIENT)
Dept: LABOR AND DELIVERY | Facility: HOSPITAL | Age: 33
End: 2023-08-07
Payer: COMMERCIAL

## 2023-08-07 PROCEDURE — C1755 CATHETER, INTRASPINAL: HCPCS | Performed by: NURSE ANESTHETIST, CERTIFIED REGISTERED

## 2023-08-07 PROCEDURE — 25010000002 ROPIVACAINE PER 1 MG: Performed by: NURSE ANESTHETIST, CERTIFIED REGISTERED

## 2023-08-07 PROCEDURE — 25010000002 CEFAZOLIN PER 500 MG: Performed by: OBSTETRICS & GYNECOLOGY

## 2023-08-07 PROCEDURE — 51702 INSERT TEMP BLADDER CATH: CPT

## 2023-08-07 PROCEDURE — 3E0DXGC INTRODUCTION OF OTHER THERAPEUTIC SUBSTANCE INTO MOUTH AND PHARYNX, EXTERNAL APPROACH: ICD-10-PCS | Performed by: OBSTETRICS & GYNECOLOGY

## 2023-08-07 PROCEDURE — 3E033VJ INTRODUCTION OF OTHER HORMONE INTO PERIPHERAL VEIN, PERCUTANEOUS APPROACH: ICD-10-PCS | Performed by: OBSTETRICS & GYNECOLOGY

## 2023-08-07 RX ORDER — OXYTOCIN/0.9 % SODIUM CHLORIDE 30/500 ML
2-20 PLASTIC BAG, INJECTION (ML) INTRAVENOUS
Status: DISCONTINUED | OUTPATIENT
Start: 2023-08-07 | End: 2023-08-07

## 2023-08-07 RX ORDER — LIDOCAINE HYDROCHLORIDE 20 MG/ML
INJECTION, SOLUTION EPIDURAL; INFILTRATION; INTRACAUDAL; PERINEURAL AS NEEDED
Status: DISCONTINUED | OUTPATIENT
Start: 2023-08-07 | End: 2023-08-10 | Stop reason: SURG

## 2023-08-07 RX ORDER — LIDOCAINE HYDROCHLORIDE AND EPINEPHRINE 15; 5 MG/ML; UG/ML
INJECTION, SOLUTION EPIDURAL AS NEEDED
Status: DISCONTINUED | OUTPATIENT
Start: 2023-08-07 | End: 2023-08-10 | Stop reason: SURG

## 2023-08-07 RX ORDER — EPHEDRINE SULFATE 50 MG/ML
10 INJECTION, SOLUTION INTRAVENOUS
Status: DISCONTINUED | OUTPATIENT
Start: 2023-08-07 | End: 2023-08-08 | Stop reason: HOSPADM

## 2023-08-07 RX ORDER — TRISODIUM CITRATE DIHYDRATE AND CITRIC ACID MONOHYDRATE 500; 334 MG/5ML; MG/5ML
30 SOLUTION ORAL ONCE
Status: COMPLETED | OUTPATIENT
Start: 2023-08-07 | End: 2023-08-08

## 2023-08-07 RX ORDER — FAMOTIDINE 10 MG/ML
20 INJECTION, SOLUTION INTRAVENOUS ONCE AS NEEDED
Status: CANCELLED | OUTPATIENT
Start: 2023-08-07

## 2023-08-07 RX ORDER — ROPIVACAINE HYDROCHLORIDE 2 MG/ML
INJECTION, SOLUTION EPIDURAL; INFILTRATION; PERINEURAL AS NEEDED
Status: DISCONTINUED | OUTPATIENT
Start: 2023-08-07 | End: 2023-08-10 | Stop reason: SURG

## 2023-08-07 RX ADMIN — EPHEDRINE SULFATE 10 MG: 50 INJECTION, SOLUTION INTRAVENOUS at 21:44

## 2023-08-07 RX ADMIN — ROPIVACAINE HYDROCHLORIDE 8 ML/HR: 2 INJECTION, SOLUTION EPIDURAL; INFILTRATION at 12:18

## 2023-08-07 RX ADMIN — SODIUM CHLORIDE, POTASSIUM CHLORIDE, SODIUM LACTATE AND CALCIUM CHLORIDE 1000 ML: 600; 310; 30; 20 INJECTION, SOLUTION INTRAVENOUS at 11:22

## 2023-08-07 RX ADMIN — Medication 25 MCG: at 01:30

## 2023-08-07 RX ADMIN — Medication 25 MCG: at 05:47

## 2023-08-07 RX ADMIN — ACETAMINOPHEN 650 MG: 325 TABLET ORAL at 20:22

## 2023-08-07 RX ADMIN — HYDROXYZINE HYDROCHLORIDE 10 MG: 10 TABLET, FILM COATED ORAL at 21:01

## 2023-08-07 RX ADMIN — SODIUM CHLORIDE, POTASSIUM CHLORIDE, SODIUM LACTATE AND CALCIUM CHLORIDE 999 ML/HR: 600; 310; 30; 20 INJECTION, SOLUTION INTRAVENOUS at 12:11

## 2023-08-07 RX ADMIN — BUPROPION HYDROCHLORIDE 150 MG: 150 TABLET, EXTENDED RELEASE ORAL at 21:01

## 2023-08-07 RX ADMIN — ACETAMINOPHEN 650 MG: 325 TABLET ORAL at 15:01

## 2023-08-07 RX ADMIN — CEFAZOLIN 2 G: 2 INJECTION, POWDER, FOR SOLUTION INTRAMUSCULAR; INTRAVENOUS at 11:59

## 2023-08-07 RX ADMIN — ROPIVACAINE HYDROCHLORIDE 5 ML: 2 INJECTION, SOLUTION EPIDURAL; INFILTRATION at 20:04

## 2023-08-07 RX ADMIN — LIDOCAINE HYDROCHLORIDE AND EPINEPHRINE 3 ML: 15; 5 INJECTION, SOLUTION EPIDURAL at 12:08

## 2023-08-07 RX ADMIN — ACETAMINOPHEN 650 MG: 325 TABLET ORAL at 01:30

## 2023-08-07 RX ADMIN — Medication 2 MILLI-UNITS/MIN: at 13:23

## 2023-08-07 RX ADMIN — SODIUM CHLORIDE, POTASSIUM CHLORIDE, SODIUM LACTATE AND CALCIUM CHLORIDE 125 ML/HR: 600; 310; 30; 20 INJECTION, SOLUTION INTRAVENOUS at 22:50

## 2023-08-07 RX ADMIN — LIDOCAINE HYDROCHLORIDE 5 ML: 20 INJECTION, SOLUTION EPIDURAL; INFILTRATION; INTRACAUDAL; PERINEURAL at 12:13

## 2023-08-07 RX ADMIN — EPHEDRINE SULFATE 10 MG: 50 INJECTION, SOLUTION INTRAVENOUS at 21:37

## 2023-08-07 RX ADMIN — SODIUM CHLORIDE, POTASSIUM CHLORIDE, SODIUM LACTATE AND CALCIUM CHLORIDE 125 ML/HR: 600; 310; 30; 20 INJECTION, SOLUTION INTRAVENOUS at 18:18

## 2023-08-07 RX ADMIN — SERTRALINE HYDROCHLORIDE 50 MG: 50 TABLET, FILM COATED ORAL at 21:02

## 2023-08-07 NOTE — ANESTHESIA PROCEDURE NOTES
Labor Epidural      Patient reassessed immediately prior to procedure    Patient location during procedure: OB  Start Time: 8/7/2023 11:56 AM  Stop Time: 8/7/2023 12:06 PM  Indication:at surgeon's request  Performed By  CRNA/CAA: Cristobal Mcdaniels CRNA  Preanesthetic Checklist  Completed: patient identified, IV checked, site marked, risks and benefits discussed, surgical consent, monitors and equipment checked, pre-op evaluation and timeout performed  Prep:  Pt Position:sitting  Sterile Tech:gloves, cap, sterile barrier and mask  Prep:chlorhexidine gluconate and isopropyl alcohol  Monitoring:continuous pulse oximetry, EKG and blood pressure monitoring  Epidural Block Procedure:  Approach:midline  Guidance:landmark technique and palpation technique  Location:L4-L5  Needle Type:Tuohy  Needle Gauge:17 G  Loss of Resistance Medium: saline  Loss of Resistance: 9cm  Cath Depth at skin:15 cm  Paresthesia: none  Aspiration:negative  Test Dose:negative  Number of Attempts: 1  Post Assessment:  Dressing:secured with tape and occlusive dressing applied  Pt Tolerance:patient tolerated the procedure well with no apparent complications  Complications:no           Mastoid Interpolation Flap Text: A decision was made to reconstruct the defect utilizing an interpolation axial flap and a staged reconstruction.  A telfa template was made of the defect.  This telfa template was then used to outline the mastoid interpolation flap.  The donor area for the pedicle flap was then injected with anesthesia.  The flap was excised through the skin and subcutaneous tissue down to the layer of the underlying musculature.  The pedicle flap was carefully excised within this deep plane to maintain its blood supply.  The edges of the donor site were undermined.   The donor site was closed in a primary fashion.  The pedicle was then rotated into position and sutured.  Once the tube was sutured into place, adequate blood supply was confirmed with blanching and refill.  The pedicle was then wrapped with xeroform gauze and dressed appropriately with a telfa and gauze bandage to ensure continued blood supply and protect the attached pedicle.

## 2023-08-07 NOTE — PLAN OF CARE
Goal Outcome Evaluation:  Plan of Care Reviewed With: patient, spouse           Outcome Evaluation: IOL for dates.  Cytotec PO Q4 hours x 4 doses, last dose at 0530.  Patient given Tylenol 650mg for mild cramping in abdomen and back rating 3-4/10, which helped with pain.  Patient has been resting throughout the night.  VSS.  Patient wants epidural when in labor.  Spouse at bedside.

## 2023-08-07 NOTE — INTERVAL H&P NOTE
H&P reviewed. The patient was examined and there are no changes to the H&P.  Received Cytotec overnight and this morning the cervix is 1 cm and 60% effaced.  18 Albanian 30 cc transcervical Gifford bulb placed without difficulty.  Plan Pitocin augmentation and amniotomy later this morning.

## 2023-08-07 NOTE — ANESTHESIA PREPROCEDURE EVALUATION
Anesthesia Evaluation     NPO Solid Status: N/A  NPO Liquid Status: N/A           Airway   Dental      Pulmonary    (+) asthma,  Cardiovascular   Exercise tolerance: excellent (>7 METS)        Neuro/Psych  (+) psychiatric history Depression and Anxiety  GI/Hepatic/Renal/Endo    (+) obesity    Musculoskeletal     Abdominal    Substance History      OB/GYN    (+) Pregnant        Other                        Anesthesia Plan    ASA 2     epidural       Anesthetic plan, risks, benefits, and alternatives have been provided, discussed and informed consent has been obtained with: patient.    CODE STATUS:    Code Status (Patient has no pulse and is not breathing): CPR (Attempt to Resuscitate)  Medical Interventions (Patient has pulse or is breathing): Full Support  Release to patient: Routine Release

## 2023-08-07 NOTE — PLAN OF CARE
"Goal Outcome Evaluation:              Outcome Evaluation: IOL for dates. Patient had a murphy bulb placed at 0730 and it was removed with patient dilated to 3/60/-3. Patient was AROM at 1246. Patient has an epidural in place, Catheter is in place. Patient was started on Pitocin at 1323 and is currently at 10 of pitocin. Patient was last checked at 1736 and was 3/60/-3. Patient has not had complaints of pain besides feeling some cramps in her lower abdomen but said it was under control with epidural. VSS.  \"Correction- Last SVE was at 1635\"         "

## 2023-08-07 NOTE — LACTATION NOTE
Mother's Name: Megan Medrano  Phone #: [phone]   Infant Name: Diane  :  Gestation: 40w5d  Day of life:  Birth weight:    Discharge weight:  Weight Loss:   24 hour Summary of Feeds:  Voids:  Stools:  Assistive devices (shields, shells, etc):  Significant Maternal history: , female infertility, Depression, Asthma, Anxiety,   Maternal Concerns:    Maternal Goal: breastfeed  Mother's Medications: Wellbutrin (L3), Colace, Pepcid, Folvite, Atarax (L2), Magnesium, Zofran, Miralax, PNV, Phenergan, Zoloft, Vit D  Breastpump for home:   Ped follow up appt:    Patient sleeping. Initial bundle left with family. Offered support and teaching when patient ready.    Instructed mom our lactation team is here for continued support throughout their breastfeeding journey. Our team has encouraged mom to call with any questions or concerns that may arise after discharge.     Breastfeeding and Diaper Chart  Check List for Essentials of Positioning And Latch-on handout provided by Lactation Education Resources  Hand Expression handout provided by Lactation Education Resources  Five Keys to Successful Breastfeeding handout provided by Lactation Education Resources    The Many Benefits if Breastfeeding handout given  Breastfeeding saves time  *Breastfeeding allows you to calm or feed your baby immediately, which leads to a happier baby who cries less  *There is nothing to buy, prepare, or maintain.There is nothing to clean or sterilize.  Breastfeeding builds a mothers confidence  *She knows all her baby needs to thrive is her!  Breastfeeding saves Money  *There is no formula to buy and healthier breast fed babies have less medical costs  Healthy Mom/Healthy baby  * babies get sick less often, and when they do they are usually sick less severely and for a shorter time  * babies have fewer ear infections  * babies have fewer allergies  *Mothers who breastfeed have a lower risk for cancer,  osteoporosis, anemia, high blood pressure, obesity, and Type ll diabetes  *Mothers miss less work days with sick babies  Breast fed babies have a better dental health  * babies have better jaw development which requires lest orthodontic work  *Breast milk does not promote cavities  * babies can nurse at night without worry of tooth decay  Breastfeeding allows a baby to reach his full IQ potential  *The longer a baby is breast fed, the better their brain development  Breast fed babies and moms are more relaxed  *The hormones released during breastfeeding have a calming effect on mothers  *Breastfeeding requires mom to take a break; this may help mom get more rest after delivery  *Breastfeeding is quicker than preparing formula which allows mom and baby to get back to sleep faster  *Breastfeeding promotes bonding and allows mom to learn babies cues and care needs more quickly  Breastfeeding cleanup is easier  *The bowel movements and spit up of breast fed babies doesn't smell as bad  *Spit-up of breast fed babies doesn't stain clothing  Getting out of the hourse is easier  *No formula bottles to prepare and carry safely   *No time restraints due to worry about what baby will eat  *No worries about warming a bottle or finding safe water to prepare bottles  Breastfeeding mother get their bodies back sooner  *The uterus shrinks more quickly and completely, which allows a flatter tummy  *Breastfeeding burns 400-500 calories a day; making milk torches stored fat!  Breastfeeding is better for the environment  *There is no trash to dispose of after breastfeeding  *There is no production facility to produce breast milk; moms body does it all without the pollution of a factory      Your Guide to Breastfeeding Booklet by [employer], www.[employer]      Safe Storage of Breastmilk magnet: clickTRUE

## 2023-08-08 PROBLEM — Z3A.40 40 WEEKS GESTATION OF PREGNANCY: Status: RESOLVED | Noted: 2023-08-06 | Resolved: 2023-08-08

## 2023-08-08 PROBLEM — Z34.90 PREGNANCY: Status: RESOLVED | Noted: 2023-05-24 | Resolved: 2023-08-08

## 2023-08-08 PROBLEM — Z34.00 PRIMIGRAVIDA: Status: RESOLVED | Noted: 2022-12-21 | Resolved: 2023-08-08

## 2023-08-08 PROCEDURE — 88307 TISSUE EXAM BY PATHOLOGIST: CPT | Performed by: OBSTETRICS & GYNECOLOGY

## 2023-08-08 PROCEDURE — 25010000002 ROPIVACAINE PER 1 MG: Performed by: NURSE ANESTHETIST, CERTIFIED REGISTERED

## 2023-08-08 PROCEDURE — 25010000002 ONDANSETRON PER 1 MG: Performed by: NURSE ANESTHETIST, CERTIFIED REGISTERED

## 2023-08-08 PROCEDURE — 25010000002 AZITHROMYCIN PER 500 MG: Performed by: OBSTETRICS & GYNECOLOGY

## 2023-08-08 PROCEDURE — 25010000002 HYDROMORPHONE 1 MG/ML SOLUTION: Performed by: NURSE ANESTHETIST, CERTIFIED REGISTERED

## 2023-08-08 PROCEDURE — 25010000002 FENTANYL CITRATE (PF) 100 MCG/2ML SOLUTION: Performed by: NURSE ANESTHETIST, CERTIFIED REGISTERED

## 2023-08-08 PROCEDURE — 25010000002 OXYTOCIN PER 10 UNITS: Performed by: NURSE ANESTHETIST, CERTIFIED REGISTERED

## 2023-08-08 PROCEDURE — 25010000002 METOCLOPRAMIDE PER 10 MG: Performed by: OBSTETRICS & GYNECOLOGY

## 2023-08-08 PROCEDURE — 25010000002 FENTANYL CITRATE (PF) 50 MCG/ML SOLUTION: Performed by: NURSE ANESTHETIST, CERTIFIED REGISTERED

## 2023-08-08 PROCEDURE — 59510 CESAREAN DELIVERY: CPT | Performed by: OBSTETRICS & GYNECOLOGY

## 2023-08-08 DEVICE — HEMOST ABS SURGICEL PWDR 3GM: Type: IMPLANTABLE DEVICE | Site: UTERUS | Status: FUNCTIONAL

## 2023-08-08 RX ORDER — OXYCODONE HYDROCHLORIDE 5 MG/1
10 TABLET ORAL EVERY 4 HOURS PRN
Status: DISCONTINUED | OUTPATIENT
Start: 2023-08-08 | End: 2023-08-12 | Stop reason: HOSPADM

## 2023-08-08 RX ORDER — TRISODIUM CITRATE DIHYDRATE AND CITRIC ACID MONOHYDRATE 500; 334 MG/5ML; MG/5ML
30 SOLUTION ORAL ONCE
Status: DISCONTINUED | OUTPATIENT
Start: 2023-08-08 | End: 2023-08-11

## 2023-08-08 RX ORDER — PROMETHAZINE HYDROCHLORIDE 25 MG/1
25 TABLET ORAL EVERY 6 HOURS PRN
Status: DISCONTINUED | OUTPATIENT
Start: 2023-08-08 | End: 2023-08-12 | Stop reason: HOSPADM

## 2023-08-08 RX ORDER — KETOROLAC TROMETHAMINE 15 MG/ML
15 INJECTION, SOLUTION INTRAMUSCULAR; INTRAVENOUS EVERY 6 HOURS
Status: DISCONTINUED | OUTPATIENT
Start: 2023-08-09 | End: 2023-08-08

## 2023-08-08 RX ORDER — OXYTOCIN/0.9 % SODIUM CHLORIDE 30/500 ML
2-20 PLASTIC BAG, INJECTION (ML) INTRAVENOUS
Status: DISCONTINUED | OUTPATIENT
Start: 2023-08-08 | End: 2023-08-11

## 2023-08-08 RX ORDER — PROMETHAZINE HYDROCHLORIDE 12.5 MG/1
12.5 SUPPOSITORY RECTAL EVERY 6 HOURS PRN
Status: DISCONTINUED | OUTPATIENT
Start: 2023-08-08 | End: 2023-08-12 | Stop reason: HOSPADM

## 2023-08-08 RX ORDER — METOCLOPRAMIDE HYDROCHLORIDE 5 MG/ML
10 INJECTION INTRAMUSCULAR; INTRAVENOUS EVERY 6 HOURS
Status: DISCONTINUED | OUTPATIENT
Start: 2023-08-08 | End: 2023-08-11

## 2023-08-08 RX ORDER — OXYCODONE HYDROCHLORIDE 5 MG/1
5 TABLET ORAL EVERY 4 HOURS PRN
Status: DISCONTINUED | OUTPATIENT
Start: 2023-08-08 | End: 2023-08-12 | Stop reason: HOSPADM

## 2023-08-08 RX ORDER — FAMOTIDINE 10 MG/ML
20 INJECTION, SOLUTION INTRAVENOUS EVERY 12 HOURS SCHEDULED
Status: DISCONTINUED | OUTPATIENT
Start: 2023-08-08 | End: 2023-08-11

## 2023-08-08 RX ORDER — KETOROLAC TROMETHAMINE 15 MG/ML
15 INJECTION, SOLUTION INTRAMUSCULAR; INTRAVENOUS EVERY 6 HOURS
Status: DISCONTINUED | OUTPATIENT
Start: 2023-08-08 | End: 2023-08-09

## 2023-08-08 RX ORDER — FENTANYL CITRATE 50 UG/ML
INJECTION, SOLUTION INTRAMUSCULAR; INTRAVENOUS AS NEEDED
Status: DISCONTINUED | OUTPATIENT
Start: 2023-08-08 | End: 2023-08-10 | Stop reason: SURG

## 2023-08-08 RX ORDER — PRENATAL VIT/IRON FUM/FOLIC AC 27MG-0.8MG
1 TABLET ORAL DAILY
Status: DISCONTINUED | OUTPATIENT
Start: 2023-08-09 | End: 2023-08-12 | Stop reason: HOSPADM

## 2023-08-08 RX ORDER — ACETAMINOPHEN 500 MG
1000 TABLET ORAL ONCE
Status: COMPLETED | OUTPATIENT
Start: 2023-08-08 | End: 2023-08-08

## 2023-08-08 RX ORDER — SODIUM CHLORIDE 9 MG/ML
40 INJECTION, SOLUTION INTRAVENOUS AS NEEDED
Status: CANCELLED | OUTPATIENT
Start: 2023-08-08

## 2023-08-08 RX ORDER — ONDANSETRON 4 MG/1
4 TABLET, FILM COATED ORAL EVERY 8 HOURS PRN
Status: DISCONTINUED | OUTPATIENT
Start: 2023-08-08 | End: 2023-08-12 | Stop reason: HOSPADM

## 2023-08-08 RX ORDER — IBUPROFEN 600 MG/1
600 TABLET ORAL EVERY 6 HOURS
Status: DISCONTINUED | OUTPATIENT
Start: 2023-08-10 | End: 2023-08-08

## 2023-08-08 RX ORDER — OXYTOCIN 10 [USP'U]/ML
INJECTION, SOLUTION INTRAMUSCULAR; INTRAVENOUS AS NEEDED
Status: DISCONTINUED | OUTPATIENT
Start: 2023-08-08 | End: 2023-08-10 | Stop reason: SURG

## 2023-08-08 RX ORDER — SIMETHICONE 80 MG
80 TABLET,CHEWABLE ORAL 4 TIMES DAILY PRN
Status: DISCONTINUED | OUTPATIENT
Start: 2023-08-08 | End: 2023-08-12 | Stop reason: HOSPADM

## 2023-08-08 RX ORDER — ONDANSETRON 2 MG/ML
4 INJECTION INTRAMUSCULAR; INTRAVENOUS EVERY 6 HOURS PRN
Status: DISCONTINUED | OUTPATIENT
Start: 2023-08-08 | End: 2023-08-12 | Stop reason: HOSPADM

## 2023-08-08 RX ORDER — ROPIVACAINE HYDROCHLORIDE 5 MG/ML
INJECTION, SOLUTION EPIDURAL; INFILTRATION; PERINEURAL AS NEEDED
Status: DISCONTINUED | OUTPATIENT
Start: 2023-08-08 | End: 2023-08-10 | Stop reason: SURG

## 2023-08-08 RX ORDER — ACETAMINOPHEN 500 MG
1000 TABLET ORAL EVERY 6 HOURS
Status: COMPLETED | OUTPATIENT
Start: 2023-08-09 | End: 2023-08-09

## 2023-08-08 RX ORDER — ONDANSETRON 2 MG/ML
INJECTION INTRAMUSCULAR; INTRAVENOUS AS NEEDED
Status: DISCONTINUED | OUTPATIENT
Start: 2023-08-08 | End: 2023-08-10 | Stop reason: SURG

## 2023-08-08 RX ORDER — IBUPROFEN 600 MG/1
600 TABLET ORAL EVERY 6 HOURS
Status: DISCONTINUED | OUTPATIENT
Start: 2023-08-09 | End: 2023-08-09

## 2023-08-08 RX ORDER — ACETAMINOPHEN 325 MG/1
650 TABLET ORAL EVERY 6 HOURS
Status: DISCONTINUED | OUTPATIENT
Start: 2023-08-10 | End: 2023-08-11

## 2023-08-08 RX ADMIN — LIDOCAINE HYDROCHLORIDE 10 ML: 20 INJECTION, SOLUTION EPIDURAL; INFILTRATION; INTRACAUDAL; PERINEURAL at 19:45

## 2023-08-08 RX ADMIN — FENTANYL CITRATE 100 MCG: 50 INJECTION, SOLUTION INTRAMUSCULAR; INTRAVENOUS at 17:50

## 2023-08-08 RX ADMIN — HYDROMORPHONE HYDROCHLORIDE 1 MG: 1 INJECTION, SOLUTION INTRAMUSCULAR; INTRAVENOUS; SUBCUTANEOUS at 20:27

## 2023-08-08 RX ADMIN — Medication 2 MILLI-UNITS/MIN: at 03:54

## 2023-08-08 RX ADMIN — SODIUM CHLORIDE, POTASSIUM CHLORIDE, SODIUM LACTATE AND CALCIUM CHLORIDE 125 ML/HR: 600; 310; 30; 20 INJECTION, SOLUTION INTRAVENOUS at 04:58

## 2023-08-08 RX ADMIN — ROPIVACAINE HYDROCHLORIDE 8 ML: 2 INJECTION, SOLUTION EPIDURAL; INFILTRATION at 11:40

## 2023-08-08 RX ADMIN — OXYTOCIN 10 UNITS: 10 INJECTION INTRAVENOUS at 20:30

## 2023-08-08 RX ADMIN — ROPIVACAINE HYDROCHLORIDE 5 MG: 5 INJECTION, SOLUTION EPIDURAL; INFILTRATION; PERINEURAL at 20:33

## 2023-08-08 RX ADMIN — FENTANYL CITRATE 100 MCG: 50 INJECTION, SOLUTION INTRAMUSCULAR; INTRAVENOUS at 14:18

## 2023-08-08 RX ADMIN — LIDOCAINE HYDROCHLORIDE 6 ML: 20 INJECTION, SOLUTION EPIDURAL; INFILTRATION; INTRACAUDAL; PERINEURAL at 20:07

## 2023-08-08 RX ADMIN — SERTRALINE HYDROCHLORIDE 50 MG: 50 TABLET, FILM COATED ORAL at 22:35

## 2023-08-08 RX ADMIN — ONDANSETRON 4 MG: 2 INJECTION INTRAMUSCULAR; INTRAVENOUS at 20:23

## 2023-08-08 RX ADMIN — AZITHROMYCIN DIHYDRATE 500 MG: 500 INJECTION, POWDER, LYOPHILIZED, FOR SOLUTION INTRAVENOUS at 20:50

## 2023-08-08 RX ADMIN — FAMOTIDINE 20 MG: 10 INJECTION INTRAVENOUS at 19:25

## 2023-08-08 RX ADMIN — ONDANSETRON 4 MG: 2 INJECTION INTRAMUSCULAR; INTRAVENOUS at 20:14

## 2023-08-08 RX ADMIN — SODIUM CITRATE AND CITRIC ACID MONOHYDRATE 30 ML: 500; 334 SOLUTION ORAL at 19:23

## 2023-08-08 RX ADMIN — ACETAMINOPHEN 1000 MG: 500 TABLET, FILM COATED ORAL at 19:24

## 2023-08-08 RX ADMIN — SODIUM CHLORIDE, POTASSIUM CHLORIDE, SODIUM LACTATE AND CALCIUM CHLORIDE 125 ML/HR: 600; 310; 30; 20 INJECTION, SOLUTION INTRAVENOUS at 13:49

## 2023-08-08 RX ADMIN — OXYTOCIN 30 UNITS: 10 INJECTION INTRAVENOUS at 20:19

## 2023-08-08 RX ADMIN — METOCLOPRAMIDE HYDROCHLORIDE 10 MG: 5 INJECTION INTRAMUSCULAR; INTRAVENOUS at 19:25

## 2023-08-08 RX ADMIN — ACETAMINOPHEN 650 MG: 325 TABLET ORAL at 02:37

## 2023-08-08 RX ADMIN — FENTANYL CITRATE 150 MCG: 50 INJECTION, SOLUTION INTRAMUSCULAR; INTRAVENOUS at 14:21

## 2023-08-08 RX ADMIN — OXYCODONE HYDROCHLORIDE 5 MG: 5 TABLET ORAL at 21:30

## 2023-08-08 RX ADMIN — ROPIVACAINE HYDROCHLORIDE 7 ML: 2 INJECTION, SOLUTION EPIDURAL; INFILTRATION at 02:30

## 2023-08-08 NOTE — PROGRESS NOTES
" Elsa URIOSTEGUI  : 1990  MRN: [id number]  CSN: [id number]    Labor progress note    Subjective   Patient currently reports is comfortable with the epidural.  Patient became very uncomfortable earlier, but has been able to rest since she was redosed.  Although she is not feeling contractions like she was earlier, the patient says she is starting to \"just not feel good\"     Objective   Min/max vitals past 24 hours:  Temp  Min: 97.9 øF (36.6 øC)  Max: 100 øF (37.8 øC)   BP  Min: 91/53  Max: 142/77   Pulse  Min: 65  Max: 91   Resp  Min: 16  Max: 18        FHT's: reactive, reassuring   Cervix: was checked (by me): 7 cm / 80 % / -2.  Fetus with significant caput   Contractions: regular, pitocin currently at 12 miU/min          Assessment   IUP at 40w6d, IOL for post-term pregnancy  GBS negative  Now ruptured since lunchtime yesterday.  Patient has had a fever and reports she is not feeling well     Plan    for arrest of descent and chorioamnionitis  The patient, her , and her mother are all present for discussion, and all of their questions were answered    Jacqueline Pemberton MD  2023  18:49 CDT          "

## 2023-08-08 NOTE — PROGRESS NOTES
Oneal Adams MD  [facility]  [address]  [place] [address]  Office [phone]  Fax [phone]      [facility]  Megan URIOSTEGUI  : 1990  MRN: [id number]  CSN: [id number]    Labor progress note    Subjective   She reports is comfortable with the epidural. Had pitocin break for a few hours overnight.     Objective   Min/max vitals past 24 hours:  Temp  Min: 97.8 øF (36.6 øC)  Max: 99.7 øF (37.6 øC)   BP  Min: 91/53  Max: 146/76   Pulse  Min: 63  Max: 120   Resp  Min: 14  Max: 18        FHT's: reactive and category 1.  external monitors used   Cervix: was checked (by me): 4-5 cm / 80 % / -3   Contractions: regular every 3-4 minutes  Pitocin at 8      Assessment   IUP at 40w6d  Post dates induction     Plan   1.   Continue with augmentation  Discussed now that we are seeing cervical change, she should continue to make progress. No signs of infection at this time.  Allow labor to continue pending maternal and fetal status  Plan discussed with family and questions answered.  Understanding verbalized.    Oneal Adams MD  2023  09:15 CDT

## 2023-08-08 NOTE — PLAN OF CARE
Goal Outcome Evaluation:              Outcome Evaluation: IOL for Dates, Last SVE 3-4/80-90/-3, patient on pitocin break last PM, restarted this AM, Epidural for pain control, will continue per protocol.

## 2023-08-08 NOTE — PROGRESS NOTES
Oneal Adams MD  [facility]  [address]  [place] [id number]  Office [phone]  Fax [phone]      [facility]  Megan URIOSTEGUI  : 1990  MRN: [id number]  CSN: [id number]    Labor progress note    Subjective   She reports is comfortable with the epidural     Objective   Min/max vitals past 24 hours:  Temp  Min: 97.9 øF (36.6 øC)  Max: 99.7 øF (37.6 øC)   BP  Min: 91/53  Max: 142/77   Pulse  Min: 64  Max: 91   Resp  Min: 16  Max: 18        FHT's: reassuring and category 1.  external monitors used   Cervix: was checked (by me): 7 cm / 80 % / -2   Contractions: regular every 3 minutes  Pitocin at 12      Assessment   IUP at 40w6d  Post dates induction     Plan   1.   Continue with augmentation  Baseline increased to 150, discussed potential for chorioamnionitis, but patient currently afebrile and making progress through labor. Will continue with plan for vaginal delivery but discussed possible  if develops fever or labor stalls.  Allow labor to continue pending maternal and fetal status  Plan discussed with family and questions answered.  Understanding verbalized.    Oneal Adams MD  2023  14:29 CDT

## 2023-08-09 LAB
ANISOCYTOSIS BLD QL: ABNORMAL
DEPRECATED RDW RBC AUTO: 56.5 FL (ref 37–54)
ERYTHROCYTE [DISTWIDTH] IN BLOOD BY AUTOMATED COUNT: 16.6 % (ref 12.3–15.4)
HCT VFR BLD AUTO: 27.3 % (ref 34–46.6)
HGB BLD-MCNC: 8.6 G/DL (ref 12–15.9)
LYMPHOCYTES # BLD MANUAL: 1.28 10*3/MM3 (ref 0.7–3.1)
MCH RBC QN AUTO: 29 PG (ref 26.6–33)
MCHC RBC AUTO-ENTMCNC: 31.5 G/DL (ref 31.5–35.7)
MCV RBC AUTO: 91.9 FL (ref 79–97)
NEUTROPHILS # BLD AUTO: 11.48 10*3/MM3 (ref 1.7–7)
NEUTROPHILS NFR BLD MANUAL: 71 % (ref 42.7–76)
NEUTS BAND NFR BLD MANUAL: 19 % (ref 0–5)
OVALOCYTES BLD QL SMEAR: ABNORMAL
PLATELET # BLD AUTO: 137 10*3/MM3 (ref 140–450)
PMV BLD AUTO: 11.9 FL (ref 6–12)
POLYCHROMASIA BLD QL SMEAR: ABNORMAL
RBC # BLD AUTO: 2.97 10*6/MM3 (ref 3.77–5.28)
SMALL PLATELETS BLD QL SMEAR: ABNORMAL
VARIANT LYMPHS NFR BLD MANUAL: 10 % (ref 19.6–45.3)
WBC MORPH BLD: NORMAL
WBC NRBC COR # BLD: 12.76 10*3/MM3 (ref 3.4–10.8)

## 2023-08-09 PROCEDURE — 25010000002 KETOROLAC TROMETHAMINE PER 15 MG: Performed by: OBSTETRICS & GYNECOLOGY

## 2023-08-09 PROCEDURE — 25010000002 CEFAZOLIN PER 500 MG: Performed by: OBSTETRICS & GYNECOLOGY

## 2023-08-09 PROCEDURE — 25010000002 METOCLOPRAMIDE PER 10 MG: Performed by: OBSTETRICS & GYNECOLOGY

## 2023-08-09 PROCEDURE — 85025 COMPLETE CBC W/AUTO DIFF WBC: CPT | Performed by: OBSTETRICS & GYNECOLOGY

## 2023-08-09 PROCEDURE — 85007 BL SMEAR W/DIFF WBC COUNT: CPT | Performed by: OBSTETRICS & GYNECOLOGY

## 2023-08-09 RX ORDER — OXYTOCIN/0.9 % SODIUM CHLORIDE 30/500 ML
250 PLASTIC BAG, INJECTION (ML) INTRAVENOUS CONTINUOUS
Status: ACTIVE | OUTPATIENT
Start: 2023-08-09 | End: 2023-08-09

## 2023-08-09 RX ORDER — CARBOPROST TROMETHAMINE 250 UG/ML
250 INJECTION, SOLUTION INTRAMUSCULAR AS NEEDED
Status: DISCONTINUED | OUTPATIENT
Start: 2023-08-09 | End: 2023-08-12 | Stop reason: HOSPADM

## 2023-08-09 RX ORDER — KETOROLAC TROMETHAMINE 15 MG/ML
15 INJECTION, SOLUTION INTRAMUSCULAR; INTRAVENOUS EVERY 6 HOURS
Status: COMPLETED | OUTPATIENT
Start: 2023-08-09 | End: 2023-08-09

## 2023-08-09 RX ORDER — METHYLERGONOVINE MALEATE 0.2 MG/ML
200 INJECTION INTRAVENOUS ONCE AS NEEDED
Status: DISCONTINUED | OUTPATIENT
Start: 2023-08-09 | End: 2023-08-12 | Stop reason: HOSPADM

## 2023-08-09 RX ORDER — CARBOPROST TROMETHAMINE 250 UG/ML
250 INJECTION, SOLUTION INTRAMUSCULAR
Status: DISCONTINUED | OUTPATIENT
Start: 2023-08-09 | End: 2023-08-12 | Stop reason: HOSPADM

## 2023-08-09 RX ORDER — SODIUM CHLORIDE 0.9 % (FLUSH) 0.9 %
3 SYRINGE (ML) INJECTION EVERY 12 HOURS SCHEDULED
Status: DISCONTINUED | OUTPATIENT
Start: 2023-08-09 | End: 2023-08-12 | Stop reason: HOSPADM

## 2023-08-09 RX ORDER — OXYTOCIN/0.9 % SODIUM CHLORIDE 30/500 ML
999 PLASTIC BAG, INJECTION (ML) INTRAVENOUS ONCE
Status: DISCONTINUED | OUTPATIENT
Start: 2023-08-09 | End: 2023-08-12 | Stop reason: HOSPADM

## 2023-08-09 RX ORDER — MISOPROSTOL 200 UG/1
800 TABLET ORAL AS NEEDED
Status: DISCONTINUED | OUTPATIENT
Start: 2023-08-09 | End: 2023-08-12 | Stop reason: HOSPADM

## 2023-08-09 RX ORDER — KETOROLAC TROMETHAMINE 30 MG/ML
30 INJECTION, SOLUTION INTRAMUSCULAR; INTRAVENOUS ONCE
Status: DISCONTINUED | OUTPATIENT
Start: 2023-08-09 | End: 2023-08-12 | Stop reason: HOSPADM

## 2023-08-09 RX ORDER — IBUPROFEN 600 MG/1
600 TABLET ORAL EVERY 6 HOURS PRN
Status: DISCONTINUED | OUTPATIENT
Start: 2023-08-09 | End: 2023-08-12 | Stop reason: HOSPADM

## 2023-08-09 RX ORDER — CALCIUM CARBONATE 500 MG/1
2 TABLET, CHEWABLE ORAL 3 TIMES DAILY PRN
Status: DISCONTINUED | OUTPATIENT
Start: 2023-08-09 | End: 2023-08-12 | Stop reason: HOSPADM

## 2023-08-09 RX ORDER — ONDANSETRON 2 MG/ML
4 INJECTION INTRAMUSCULAR; INTRAVENOUS EVERY 6 HOURS PRN
Status: DISCONTINUED | OUTPATIENT
Start: 2023-08-09 | End: 2023-08-12 | Stop reason: HOSPADM

## 2023-08-09 RX ORDER — ONDANSETRON 4 MG/1
4 TABLET, FILM COATED ORAL EVERY 6 HOURS PRN
Status: DISCONTINUED | OUTPATIENT
Start: 2023-08-09 | End: 2023-08-12 | Stop reason: HOSPADM

## 2023-08-09 RX ORDER — SODIUM CHLORIDE 0.9 % (FLUSH) 0.9 %
3-10 SYRINGE (ML) INJECTION AS NEEDED
Status: DISCONTINUED | OUTPATIENT
Start: 2023-08-09 | End: 2023-08-12 | Stop reason: HOSPADM

## 2023-08-09 RX ORDER — MISOPROSTOL 200 UG/1
800 TABLET ORAL ONCE AS NEEDED
Status: DISCONTINUED | OUTPATIENT
Start: 2023-08-09 | End: 2023-08-12 | Stop reason: HOSPADM

## 2023-08-09 RX ORDER — IBUPROFEN 600 MG/1
600 TABLET ORAL EVERY 6 HOURS
Status: DISCONTINUED | OUTPATIENT
Start: 2023-08-09 | End: 2023-08-12 | Stop reason: HOSPADM

## 2023-08-09 RX ADMIN — FAMOTIDINE 20 MG: 10 INJECTION INTRAVENOUS at 20:59

## 2023-08-09 RX ADMIN — CEFAZOLIN 2000 MG: 2 INJECTION, POWDER, FOR SOLUTION INTRAMUSCULAR; INTRAVENOUS at 03:54

## 2023-08-09 RX ADMIN — OXYCODONE HYDROCHLORIDE 5 MG: 5 TABLET ORAL at 13:02

## 2023-08-09 RX ADMIN — OXYCODONE HYDROCHLORIDE 10 MG: 5 TABLET ORAL at 00:39

## 2023-08-09 RX ADMIN — BUPROPION HYDROCHLORIDE 150 MG: 150 TABLET, EXTENDED RELEASE ORAL at 20:59

## 2023-08-09 RX ADMIN — ACETAMINOPHEN 1000 MG: 500 TABLET, FILM COATED ORAL at 00:39

## 2023-08-09 RX ADMIN — ACETAMINOPHEN 1000 MG: 500 TABLET, FILM COATED ORAL at 19:19

## 2023-08-09 RX ADMIN — PRENATAL VIT W/ FE FUMARATE-FA TAB 27-0.8 MG 1 TABLET: 27-0.8 TAB at 10:17

## 2023-08-09 RX ADMIN — Medication 3 ML: at 21:10

## 2023-08-09 RX ADMIN — METOCLOPRAMIDE HYDROCHLORIDE 10 MG: 5 INJECTION INTRAMUSCULAR; INTRAVENOUS at 13:57

## 2023-08-09 RX ADMIN — CEFAZOLIN 2000 MG: 2 INJECTION, POWDER, FOR SOLUTION INTRAMUSCULAR; INTRAVENOUS at 12:23

## 2023-08-09 RX ADMIN — KETOROLAC TROMETHAMINE 15 MG: 15 INJECTION, SOLUTION INTRAMUSCULAR; INTRAVENOUS at 03:54

## 2023-08-09 RX ADMIN — KETOROLAC TROMETHAMINE 15 MG: 15 INJECTION, SOLUTION INTRAMUSCULAR; INTRAVENOUS at 10:17

## 2023-08-09 RX ADMIN — HYDROXYZINE HYDROCHLORIDE 10 MG: 10 TABLET, FILM COATED ORAL at 20:59

## 2023-08-09 RX ADMIN — ACETAMINOPHEN 1000 MG: 500 TABLET, FILM COATED ORAL at 07:47

## 2023-08-09 RX ADMIN — METOCLOPRAMIDE HYDROCHLORIDE 10 MG: 5 INJECTION INTRAMUSCULAR; INTRAVENOUS at 07:47

## 2023-08-09 RX ADMIN — BUPROPION HYDROCHLORIDE 150 MG: 150 TABLET, EXTENDED RELEASE ORAL at 00:27

## 2023-08-09 RX ADMIN — ACETAMINOPHEN 1000 MG: 500 TABLET, FILM COATED ORAL at 13:02

## 2023-08-09 RX ADMIN — SERTRALINE HYDROCHLORIDE 50 MG: 50 TABLET, FILM COATED ORAL at 20:59

## 2023-08-09 RX ADMIN — OXYCODONE HYDROCHLORIDE 10 MG: 5 TABLET ORAL at 20:27

## 2023-08-09 RX ADMIN — OXYCODONE HYDROCHLORIDE 10 MG: 5 TABLET ORAL at 06:09

## 2023-08-09 RX ADMIN — KETOROLAC TROMETHAMINE 15 MG: 15 INJECTION, SOLUTION INTRAMUSCULAR; INTRAVENOUS at 16:09

## 2023-08-09 RX ADMIN — FAMOTIDINE 20 MG: 10 INJECTION INTRAVENOUS at 10:17

## 2023-08-09 NOTE — PLAN OF CARE
Goal Outcome Evaluation:           Progress: improving  Outcome Evaluation: VSS, eras protocol, PRN pain meds requested x 2, voiding, ambulating, FF, ML, U1, scant lochia, ALT CDI, binder on, IV intact, using breast pump, visits NICU.

## 2023-08-09 NOTE — PLAN OF CARE
Goal Outcome Evaluation:  Plan of Care Reviewed With: patient, spouse        Progress: improving  Outcome Evaluation: IOL for dates. Last SVE at 1830 , Patient has decided to have a  Section due to failure to progress, recommended due to patient being ruptured for 29 hours and patient running a fever for majority of the evening. Patient has an epidural in place, SCDs are on, Patient has murphy in place. VSS other than fever. Patient is having a baby boy, is breast feeding, planning on  medications.         Problem: Bleeding (Labor)  Goal: Hemostasis  Outcome: Unable to Meet, Plan Revised     Problem: Change in Fetal Wellbeing (Labor)  Goal: Stable Fetal Wellbeing  Outcome: Unable to Meet, Plan Revised     Problem: Delayed Labor Progression (Labor)  Goal: Effective Progression to Delivery  Outcome: Unable to Meet, Plan Revised     Problem: Infection (Labor)  Goal: Absence of Infection Signs and Symptoms  Outcome: Unable to Meet, Plan Revised     Problem: Labor Pain (Labor)  Goal: Acceptable Pain Control  Outcome: Unable to Meet, Plan Revised     Problem: Uterine Tachysystole (Labor)  Goal: Normal Uterine Contraction Pattern  Outcome: Unable to Meet, Plan Revised

## 2023-08-09 NOTE — LACTATION NOTE
This note was copied from a baby's chart.  Name: Diane  Day: 1  Dx:   Birth Gestation: 40w6d  Adjusted Gestation:  Birth weight: 8-10.6 (3930g)  Last weight:              % of weight loss:    Feeding Orders: Breastmilk 10 ml oral every 3 hours  Maternal Hx: , Female infertility, depression, asthma, anxiety  Prenatal Medications: Wellbutrin XL, Colace, Pepcid, Folvite, Atarax, Magnesium, Miralax, PNV, Phenergan, Zoloft, Vit D3  Pump available: Medela  Pumping history in the last 24 hours: Began pump this am at 0600 (10 hours after delivery)    Observed and assisted with breastfeeding attempt. Infant not showing cues. Infant skin to skin while mother attempted hand expression. Few small drops finger fed to infant. Reviewed yellow folder and pumping plan. Hospital grade breastpump assembled in NICU for mother to pump. Infnat moved to father after swaddling. Mother began pumping with 21 mm flanges as it appeared a better fit. Areolar edema noted bilaterally. Offered support and encouragement.

## 2023-08-09 NOTE — ANESTHESIA POSTPROCEDURE EVALUATION
"Patient: Megan URIOSTEGUI    Procedure Summary       Date: 23 Room / Location: [facility] LABOR DELIVERY 2 /  [facility] LABOR DELIVERY    Anesthesia Start: 1149 Anesthesia Stop:     Procedure:  SECTION PRIMARY (Abdomen) Diagnosis:     Surgeons: Jacqueline Pemberton MD Provider: Magali Dang CRNA    Anesthesia Type: epidural ASA Status: 2            Anesthesia Type: epidural    Vitals  Vitals Value Taken Time   BP 99/59 23 1428   Temp 97 øF (36.1 øC) 23 1428   Pulse 98 23 1428   Resp 18 23 1428   SpO2 98 % 23 1428           Post Anesthesia Care and Evaluation    Patient location during evaluation: floor  Patient participation: complete - patient participated  Level of consciousness: awake and alert  Pain score: 0    Airway patency: patent  Anesthetic complications: No anesthetic complications  PONV Status: none  Cardiovascular status: acceptable  Respiratory status: acceptable  Hydration status: acceptable  Post Neuraxial Block status: Motor and sensory function returned to baseline and No signs or symptoms of PDPH  Comments: Blood pressure 99/59, pulse 98, temperature 97 øF (36.1 øC), temperature source Temporal, resp. rate 18, height 165.1 cm (65\"), weight 93 kg (205 lb 0.4 oz), last menstrual period 10/25/2022, SpO2 98 %, currently breastfeeding.        No anesthesia care post op    "

## 2023-08-09 NOTE — PROGRESS NOTES
Oneal dAams MD  [facility]  [address]  [place] [address]  Office [phone]  Fax [phone]      [facility]   PROGRESS NOTE    Post-Op Day 1 S/P   Subjective     Patient reports:  Pain is well controlled with ERAS protocol.  She is ambulating. Tolerating diet. Voiding - without difficulty; flatus reported..  Vaginal bleeding is as much as expected. Afebrile since delivery.      Objective      Vitals: Vital Signs Range for the last 24 hours  Temperature: Temp:  [97.6 øF (36.4 øC)-100.7 øF (38.2 øC)] 98.3 øF (36.8 øC)   Temp Source: Temp src: Temporal   BP: BP: ()/(51-86) 107/66   Pulse: Heart Rate:  [67-98] 92   Respirations: Resp:  [16-20] 18   SPO2: SpO2:  [95 %-100 %] 98 %   O2 Amount (l/min):     O2 Devices Device (Oxygen Therapy): room air   Weight: Weight:  [93 kg (205 lb 0.4 oz)] 93 kg (205 lb 0.4 oz)            Physical Exam    Lungs clear to auscultation bilaterally   Abdomen Soft, approp tender, no fundal tenderness   Incision  no erythema, no swelling, well approximated   Extremities edema trace and Homans sign is negative, no sign of DVT     I reviewed the patient's new clinical results.  Lab Results (last 24 hours)       Procedure Component Value Units Date/Time    Manual Differential [[id number]]  (Abnormal) Collected: 23    Specimen: Blood Updated: 23     Neutrophil % 71.0 %      Lymphocyte % 10.0 %      Bands %  19.0 %      Neutrophils Absolute 11.48 10*3/mm3      Lymphocytes Absolute 1.28 10*3/mm3      Anisocytosis Slight/1+     Ovalocytes Slight/1+     Polychromasia Slight/1+     WBC Morphology Normal     Platelet Estimate Decreased    CBC & Differential [[id number]]  (Abnormal) Collected: 23    Specimen: Blood Updated: 23    Narrative:      The following orders were created for panel order CBC & Differential.  Procedure                               Abnormality         Status                      ---------                               -----------         ------                     CBC Auto Differential[[id number]]        Abnormal            Final result                 Please view results for these tests on the individual orders.    CBC Auto Differential [[id number]]  (Abnormal) Collected: 08/09/23 0605    Specimen: Blood Updated: 08/09/23 0729     WBC 12.76 10*3/mm3      RBC 2.97 10*6/mm3      Hemoglobin 8.6 g/dL      Hematocrit 27.3 %      MCV 91.9 fL      MCH 29.0 pg      MCHC 31.5 g/dL      RDW 16.6 %      RDW-SD 56.5 fl      MPV 11.9 fL      Platelets 137 10*3/mm3     Narrative:      The previously reported component NRBC is no longer being reported. Previous result was 0.0 /100 WBC (Reference Range: 0.0-0.2 /100 WBC) on 8/9/2023 at 0708 CDT.            External Prenatal Results       Pregnancy Outside Results - Transcribed From Office Records - See Scanned Records For Details       Test Value Date Time    ABO  O  08/06/23 1716    Rh  Positive  08/06/23 1716    Antibody Screen  Negative  08/06/23 1716       Negative  12/21/22 0821    Varicella IgG       Rubella  3.24 index 12/21/22 0821    Hgb  8.6 g/dL 08/09/23 0605       11.8 g/dL 08/06/23 1716       11.6 g/dL 05/11/23 1027       12.2 g/dL 12/21/22 0821    Hct  27.3 % 08/09/23 0605       36.5 % 08/06/23 1716       37.0 % 12/21/22 0821    Glucose Fasting GTT       Glucose Tolerance Test 1 hour       Glucose Tolerance Test 3 hour       Gonorrhea (discrete)  Negative  07/11/23 1509       Negative  12/21/22 0821    Chlamydia (discrete)  Negative  07/11/23 1509       Negative  12/21/22 0821    RPR  Non Reactive  12/21/22 0821    VDRL       Syphilis Antibody       HBsAg  Negative  12/21/22 0821    Herpes Simplex Virus PCR       Herpes Simplex VIrus Culture       HIV  Non Reactive  12/21/22 0821    Hep C RNA Quant PCR       Hep C Antibody  <0.1 s/co ratio 12/21/22 0821    AFP       Group B Strep  Negative  07/11/23 1509    GBS Susceptibility to Clindamycin        GBS Susceptibility to Erythromycin       Fetal Fibronectin       Genetic Testing, Maternal Blood                 Drug Screening       Test Value Date Time    Urine Drug Screen       Amphetamine Screen       Barbiturate Screen       Benzodiazepine Screen       Methadone Screen       Phencyclidine Screen       Opiates Screen       THC Screen       Cocaine Screen       Propoxyphene Screen       Buprenorphine Screen       Methamphetamine Screen       Oxycodone Screen       Tricyclic Antidepressants Screen                 Legend    ^: Historical                            Assessment & Plan        * No active hospital problems. *      Assessment:    Megan URIOSTEGUI is Day 1  post-partum    , Low Transverse     .       Plan:  saline lock IV fluids, advance diet  normal diet as tolerated, and continue post op care.        Oneal Adams MD  2023  07:43 CDT

## 2023-08-09 NOTE — OP NOTE
Elsa URIOSTEGUI  : 1990  MRN: [id number]  CSN: [id number]     Section Operative Note    Pre-Operative Dx:   Intrauterine pregnancy at 40w6d weeks   failure to progress: arrest of dilation  chorioamnionitis      Postoperative dx:    Same as above     Procedure: Primary low transverse -section (LTCS)       Surgeon: Jacqueline Pemberton MD           Anesthesia: Epidural        EBL: 2100 mls.   IV Fluids: 1300 mls.   UOP: clear.     Antibiotics: cefazolin 2 gms and 500 mg azithromycin     Infant:           Gender: male  infant    Weight: No birth weight on file.     Apgars:   @ 1 minute /   @ 5 minutes    Cord gases: Venous:No results found for: PHCVEN, LDY3JNAZ, HP5ROAJ, YIZ1IIGZ, BECVEN, D5RWRBHRH     Arterial:No results found for: PHCART, DIO6YLKZ, KL2CHJQ, LXK0VBCM, BECART, P8ATQXJBP     Procedure Details:   The patient was taken to the OR where she was prepped and draped in the usual sterile fashion in the dorsal supine position with a leftward lateral tilt.  A Pfannenstiel incision was created sharply with the knife. That incision was carried through the underlying layers of fascia sharply.  The fascia was incised in the midline with the scalpel and this incision further developed using the fascial rip technique. The fascia was freed from its midline insertion superiorly and inferiorly. Rectus muscles were  in the midline. The peritoneum was entered bluntly, and the peritoneal window further developed using blunt stretching.  A medium Protractor was placed to aid with visualization.  A bladder flap was created sharply. The lower uterine segment was incised in a transverse fashion with the scalpel, and the uterine incision further developed with blunt stretching. Clear amniotic fluid was noted. The infant's head was delivered atraumatically. The mouth and nose were bulb suctioned.  The cord was clamped and cut after a 30 second delay.  The infant was then handed off to  waiting NICU staff.  The placenta was allowed to deliver spontaneously. The uterus was cleared of clot and debris with moist laparotomy sponges.  The uterine incision was noted to have extensions at both angles, with bleeding from both of the uterine arteries.  Bear clamps were placed on each side to reduce blood loss.  The uterine incision was closed with #0 vicryl in a continuous running locked fashion.  A second layer of #0 vicryl in a running fashion was used to imbricate the first.  Upon careful inspection, the uterine incision was noted to be hemostatic.  The bladder flap was not reapproximated.    The adnexa were carefully inspected and noted to be normal, bilaterally.    The paracolic gutters were cleared of clot and debris with moist laparotomy sponges. The uterine incision was inspected one final time and found to be hemostatic.  Jenna hemostatic matrix was applied over the hysterotomy incision to prevent adhesion formation during healing. The fascia was reapproximated  with #0 vicryl in a running fashion.  Conor's fascia was loosely reapproximated with 3-0 vicryl and the skin was closed with 4-0 monocryl using a subcuticular stitch. Skin glue was applied bandage.  All counts were correct X2.         Complications:   None      Disposition:   Mother to Mother Baby/Postpartum  in stable condition currently.   Baby to remains with mom  in stable condition currently.       Jacqueline Pemberton MD  8/8/2023  21:08 CDT

## 2023-08-10 PROCEDURE — 25010000002 AZITHROMYCIN PER 500 MG: Performed by: OBSTETRICS & GYNECOLOGY

## 2023-08-10 RX ORDER — DOCUSATE SODIUM 100 MG/1
100 CAPSULE, LIQUID FILLED ORAL 2 TIMES DAILY
Status: DISCONTINUED | OUTPATIENT
Start: 2023-08-10 | End: 2023-08-12 | Stop reason: HOSPADM

## 2023-08-10 RX ORDER — LIDOCAINE 50 MG/G
1 PATCH TOPICAL
Status: DISCONTINUED | OUTPATIENT
Start: 2023-08-10 | End: 2023-08-12 | Stop reason: HOSPADM

## 2023-08-10 RX ORDER — TRAMADOL HYDROCHLORIDE 50 MG/1
50 TABLET ORAL EVERY 6 HOURS PRN
Status: DISCONTINUED | OUTPATIENT
Start: 2023-08-10 | End: 2023-08-12 | Stop reason: HOSPADM

## 2023-08-10 RX ORDER — CYCLOBENZAPRINE HCL 10 MG
10 TABLET ORAL 3 TIMES DAILY
Status: DISCONTINUED | OUTPATIENT
Start: 2023-08-10 | End: 2023-08-12 | Stop reason: HOSPADM

## 2023-08-10 RX ORDER — POLYETHYLENE GLYCOL 3350 17 G/17G
17 POWDER, FOR SOLUTION ORAL DAILY
Status: DISCONTINUED | OUTPATIENT
Start: 2023-08-10 | End: 2023-08-12 | Stop reason: HOSPADM

## 2023-08-10 RX ADMIN — ACETAMINOPHEN 650 MG: 325 TABLET, FILM COATED ORAL at 02:26

## 2023-08-10 RX ADMIN — IBUPROFEN 600 MG: 600 TABLET, FILM COATED ORAL at 10:28

## 2023-08-10 RX ADMIN — TRAMADOL HYDROCHLORIDE 50 MG: 50 TABLET, COATED ORAL at 13:34

## 2023-08-10 RX ADMIN — IBUPROFEN 600 MG: 600 TABLET, FILM COATED ORAL at 00:06

## 2023-08-10 RX ADMIN — AZITHROMYCIN DIHYDRATE 500 MG: 500 INJECTION, POWDER, LYOPHILIZED, FOR SOLUTION INTRAVENOUS at 00:20

## 2023-08-10 RX ADMIN — OXYCODONE HYDROCHLORIDE 10 MG: 5 TABLET ORAL at 20:41

## 2023-08-10 RX ADMIN — IBUPROFEN 600 MG: 600 TABLET, FILM COATED ORAL at 17:15

## 2023-08-10 RX ADMIN — IBUPROFEN 600 MG: 600 TABLET, FILM COATED ORAL at 22:34

## 2023-08-10 RX ADMIN — DOCUSATE SODIUM 100 MG: 100 CAPSULE, LIQUID FILLED ORAL at 20:24

## 2023-08-10 RX ADMIN — HYDROXYZINE HYDROCHLORIDE 10 MG: 10 TABLET, FILM COATED ORAL at 20:25

## 2023-08-10 RX ADMIN — ACETAMINOPHEN 650 MG: 325 TABLET, FILM COATED ORAL at 08:05

## 2023-08-10 RX ADMIN — DOCUSATE SODIUM 100 MG: 100 CAPSULE, LIQUID FILLED ORAL at 10:20

## 2023-08-10 RX ADMIN — ACETAMINOPHEN 650 MG: 325 TABLET, FILM COATED ORAL at 13:40

## 2023-08-10 RX ADMIN — CYCLOBENZAPRINE 10 MG: 10 TABLET, FILM COATED ORAL at 20:24

## 2023-08-10 RX ADMIN — FAMOTIDINE 20 MG: 10 INJECTION INTRAVENOUS at 08:05

## 2023-08-10 RX ADMIN — FAMOTIDINE 20 MG: 10 INJECTION INTRAVENOUS at 20:25

## 2023-08-10 RX ADMIN — IBUPROFEN 600 MG: 600 TABLET, FILM COATED ORAL at 05:26

## 2023-08-10 RX ADMIN — SERTRALINE HYDROCHLORIDE 50 MG: 50 TABLET, FILM COATED ORAL at 20:24

## 2023-08-10 RX ADMIN — PRENATAL VIT W/ FE FUMARATE-FA TAB 27-0.8 MG 1 TABLET: 27-0.8 TAB at 08:05

## 2023-08-10 RX ADMIN — AZITHROMYCIN DIHYDRATE 500 MG: 500 INJECTION, POWDER, LYOPHILIZED, FOR SOLUTION INTRAVENOUS at 20:25

## 2023-08-10 RX ADMIN — BUPROPION HYDROCHLORIDE 150 MG: 150 TABLET, EXTENDED RELEASE ORAL at 20:24

## 2023-08-10 RX ADMIN — ACETAMINOPHEN 650 MG: 325 TABLET, FILM COATED ORAL at 19:20

## 2023-08-10 RX ADMIN — LIDOCAINE 1 PATCH: 700 PATCH TOPICAL at 10:28

## 2023-08-10 NOTE — PLAN OF CARE
Goal Outcome Evaluation:   Pt rested good tonight with pain at tolerable level.  Voiding adequate amounts.  To NICU early in shift.  Encouraged ambulation.  Wearing abdominal binder.  Incision clean and dry.  FF/U1/Scant bleeding, minimal swelling in extremities.  Patient didn't pump last night.

## 2023-08-10 NOTE — LACTATION NOTE
This note was copied from a baby's chart.  Name: Diane  Day: 1  Dx:   Birth Gestation: 40w6d  Adjusted Gestation:  Birth weight: 8-10.6 (3930g)  Last weight:              % of weight loss:    Feeding Orders: Breastmilk 10 ml oral every 3 hours  Maternal Hx: , Female infertility, depression, asthma, anxiety  Prenatal Medications: Wellbutrin XL, Colace, Pepcid, Folvite, Atarax, Magnesium, Miralax, PNV, Phenergan, Zoloft, Vit D3  Pump available: Medela  Pumping history in the last 24 hours: Pumping every 3 hours during the day. Not pumping through the night.     Called to NICU to assist with feeding. Noted infant rooting on hands when un-swaddled. Mother with short nipples that erect when stimulated. Assisted with positioning, hand expressing, and latching. Able to express a few small drops of colostrum. Infant bobbed head with latching attempts. Several attempts made. Attempted with a nipple shield as well, however, infant continued behavior, never closing mouth and forming a seal. Breastfeeding attempt discontinued. Explained infant will improve with time and practice. Discussed milk supply and pumping. Recommended mother pump after breastfeeding attempts and every 3 hours around the clock. Suggested setting an alarm to wake through the night.  Encouragement and support provided. Understanding and appreciation verbalized. Questions denied.

## 2023-08-10 NOTE — PROGRESS NOTES
"[facility]   PROGRESS NOTE    Post-Op Day 2 S/P   Subjective   Subjective  Patient reports:  Pain is controlled with  ERAS protocol . She does not want to take the Roxicodone during the day as it makes her feel so \"groggy.\" She is ambulating. Tolerating diet. Tolerating oral intake.   Voiding - without difficulty; flatus reported..  Vaginal bleeding is as much as expected. She goes to the NICU to visit the baby, pump, and yesterday attempted to latch for breastfeeding.    Objective    Objective:  Vital signs (most recent): Blood pressure 126/81, pulse 92, temperature 97.9 øF (36.6 øC), temperature source Temporal, resp. rate 18, height 165.1 cm (65\"), weight 93 kg (205 lb 0.4 oz), last menstrual period 10/25/2022, SpO2 100 %, currently breastfeeding.     Vitals: Vital Signs Range for the last 24 hours  Temperature: Temp:  [97 øF (36.1 øC)-99.1 øF (37.3 øC)] 97.9 øF (36.6 øC)   Temp Source: Temp src: Temporal   BP: BP: ()/(54-81) 126/81   Pulse: Heart Rate:  [] 92   Respirations: Resp:  [16-18] 18   SPO2: SpO2:  [96 %-100 %] 100 %   O2 Amount (l/min):     O2 Devices Device (Oxygen Therapy): room air   Weight:              Physical Exam    Lungs Respirations even and unlabored   Abdomen Soft nontender.  Only discomfort at incision.   Incision  no drainage, no erythema, no swelling, well approximated, skin glue intact   Extremities extremities normal, atraumatic, no cyanosis. Trace edema     I reviewed the patient's new clinical results.    Assessment & Plan        * No active hospital problems. *    Assessment & Plan    Assessment:    Megan URIOSTEGUI is Day 2 post-partum    , Low Transverse     .      Plan:  continue post op care.  Reviewed ERAS protocol and discussed modifications of medications to include a Lidoderm patch, trialing Ultram for moderate pain versus Roxicodone, adding Flexeril, and reviewing nonpharmacological pain support measures.  Encouraged inpatient " lactation support with both pumping and breast-feeding.      This note has been signed electronically.    YAZMIN Arechiga  08/10/23  09:44 CDT

## 2023-08-11 LAB
CYTO UR: NORMAL
LAB AP CASE REPORT: NORMAL
Lab: NORMAL
PATH REPORT.FINAL DX SPEC: NORMAL
PATH REPORT.GROSS SPEC: NORMAL

## 2023-08-11 RX ORDER — BUPROPION HYDROCHLORIDE 150 MG/1
150 TABLET ORAL NIGHTLY
Status: DISCONTINUED | OUTPATIENT
Start: 2023-08-11 | End: 2023-08-12 | Stop reason: HOSPADM

## 2023-08-11 RX ORDER — ACETAMINOPHEN 325 MG/1
650 TABLET ORAL EVERY 6 HOURS
Status: DISCONTINUED | OUTPATIENT
Start: 2023-08-11 | End: 2023-08-12 | Stop reason: HOSPADM

## 2023-08-11 RX ORDER — TRAMADOL HYDROCHLORIDE 50 MG/1
100 TABLET ORAL EVERY 8 HOURS PRN
Status: DISCONTINUED | OUTPATIENT
Start: 2023-08-11 | End: 2023-08-12 | Stop reason: HOSPADM

## 2023-08-11 RX ORDER — BUPROPION HYDROCHLORIDE 150 MG/1
150 TABLET ORAL EVERY MORNING
Status: DISCONTINUED | OUTPATIENT
Start: 2023-08-11 | End: 2023-08-11

## 2023-08-11 RX ADMIN — BUPROPION HYDROCHLORIDE 150 MG: 150 TABLET, EXTENDED RELEASE ORAL at 21:52

## 2023-08-11 RX ADMIN — POLYETHYLENE GLYCOL 3350 17 G: 17 POWDER, FOR SOLUTION ORAL at 08:28

## 2023-08-11 RX ADMIN — TRAMADOL HYDROCHLORIDE 50 MG: 50 TABLET, COATED ORAL at 08:28

## 2023-08-11 RX ADMIN — ACETAMINOPHEN 650 MG: 325 TABLET, FILM COATED ORAL at 21:52

## 2023-08-11 RX ADMIN — IBUPROFEN 600 MG: 600 TABLET, FILM COATED ORAL at 18:07

## 2023-08-11 RX ADMIN — LIDOCAINE 1 PATCH: 700 PATCH TOPICAL at 08:28

## 2023-08-11 RX ADMIN — DOCUSATE SODIUM 100 MG: 100 CAPSULE, LIQUID FILLED ORAL at 21:52

## 2023-08-11 RX ADMIN — TRAMADOL HYDROCHLORIDE 100 MG: 50 TABLET, COATED ORAL at 21:52

## 2023-08-11 RX ADMIN — Medication 3 ML: at 08:32

## 2023-08-11 RX ADMIN — IBUPROFEN 600 MG: 600 TABLET, FILM COATED ORAL at 12:59

## 2023-08-11 RX ADMIN — ACETAMINOPHEN 650 MG: 325 TABLET, FILM COATED ORAL at 01:36

## 2023-08-11 RX ADMIN — CYCLOBENZAPRINE 10 MG: 10 TABLET, FILM COATED ORAL at 21:52

## 2023-08-11 RX ADMIN — SERTRALINE HYDROCHLORIDE 50 MG: 50 TABLET, FILM COATED ORAL at 21:52

## 2023-08-11 RX ADMIN — DOCUSATE SODIUM 100 MG: 100 CAPSULE, LIQUID FILLED ORAL at 08:28

## 2023-08-11 RX ADMIN — PRENATAL VIT W/ FE FUMARATE-FA TAB 27-0.8 MG 1 TABLET: 27-0.8 TAB at 08:28

## 2023-08-11 RX ADMIN — IBUPROFEN 600 MG: 600 TABLET, FILM COATED ORAL at 04:30

## 2023-08-11 RX ADMIN — ACETAMINOPHEN 650 MG: 325 TABLET, FILM COATED ORAL at 08:28

## 2023-08-11 NOTE — PROGRESS NOTES
[facility]   PROGRESS NOTE    Post-Op Day 3 S/P   Subjective   Subjective  Patient reports:  Pain is well controlled with  ERAS protocol .  She is ambulating. Tolerating diet. Tolerating diet and oral intake.   Voiding - without difficulty; flatus reported..  Vaginal bleeding is light.    Visits infant in NICU regularly and relates he is doing better.     Objective    Objective     Vitals: Vital Signs Range for the last 24 hours  Temperature: Temp:  [97.5 øF (36.4 øC)-98 øF (36.7 øC)] 97.5 øF (36.4 øC)   Temp Source: Temp src: Oral   BP: BP: (100-126)/(58-81) 104/68   Pulse: Heart Rate:  [72-92] 72   Respirations: Resp:  [18] 18   SPO2: SpO2:  [95 %-100 %] 96 %   O2 Amount (l/min):     O2 Devices Device (Oxygen Therapy): room air   Weight:              Physical Exam    Lungs Respirations even and unlabored.   Abdomen Soft, nontender. Fundus firm   Incision  healing well, no drainage, no erythema, no swelling, well approximated, skin glue dressing intact   Extremities extremities normal, atraumatic, no cyanosis or edema     I reviewed the patient's new clinical results.    Assessment & Plan        * No active hospital problems. *    Assessment & Plan    Assessment:    Megan URIOSTEGUI is Day 3  post-partum    , Low Transverse     .      Plan:  continue post op care. Continued lactation support for pumping and breastfeeding. Plan for discharge tomorrow.       YAZMIN Arechiga  23  08:20 CDT

## 2023-08-11 NOTE — LACTATION NOTE
This note was copied from a baby's chart.  Name: Diane  Day: 2  Dx:   Birth Gestation: 40w6d  Adjusted Gestation:  Birth weight: 8-10.6 (3930g)  Last weight:  8-11 (3940g)            % of weight loss: +    Feeding Orders: Breastmilk 30 ml oral every 3 hours  Maternal Hx: , Female infertility, depression, asthma, anxiety  Prenatal Medications: Wellbutrin XL, Colace, Pepcid, Folvite, Atarax, Magnesium, Miralax, PNV, Phenergan, Zoloft, Vit D3  Pump available: Medela  Pumping history in the last 24 hours: Pumping every 3 hours     Called to NICU to assist with feeding. Assisted with positioning with mother's permission. Infant rooting and opening wide for latch but reluctant to suck. Helped mother keep infant close with chest to mother's chest and chin in breast. Infant repeatedly latched deep with assist but no suck even with nipple shield. Hand expressed small drops and swept into infant's mouth with breast/nipple. Infant fell asleep after approximately 10 minutes. Allowed infant to rest then applied DBM to nipple shield and pulled up 0.5 ml DBM in 1ml syringe to feed once latched. Infant began sucking with milk provided. Bottle given at this time. Will begin net attempt with nipple shield and syringe of milk to stimulate suck and add SNS feeder for supplement if consistently sucking. May consider ventral position for next feeding. Offered continued support and assistance as needed.

## 2023-08-12 VITALS
OXYGEN SATURATION: 98 % | TEMPERATURE: 97.7 F | RESPIRATION RATE: 16 BRPM | HEART RATE: 50 BPM | SYSTOLIC BLOOD PRESSURE: 121 MMHG | DIASTOLIC BLOOD PRESSURE: 78 MMHG | WEIGHT: 205.03 LBS | BODY MASS INDEX: 34.16 KG/M2 | HEIGHT: 65 IN

## 2023-08-12 RX ORDER — OXYCODONE HYDROCHLORIDE 5 MG/1
5 TABLET ORAL EVERY 6 HOURS PRN
Qty: 12 TABLET | Refills: 0 | Status: SHIPPED | OUTPATIENT
Start: 2023-08-12 | End: 2023-08-15

## 2023-08-12 RX ORDER — TRAMADOL HYDROCHLORIDE 50 MG/1
50 TABLET ORAL EVERY 6 HOURS PRN
Qty: 12 TABLET | Refills: 0 | Status: SHIPPED | OUTPATIENT
Start: 2023-08-12 | End: 2024-08-11

## 2023-08-12 RX ADMIN — TRAMADOL HYDROCHLORIDE 50 MG: 50 TABLET, COATED ORAL at 06:59

## 2023-08-12 RX ADMIN — POLYETHYLENE GLYCOL 3350 17 G: 17 POWDER, FOR SOLUTION ORAL at 08:41

## 2023-08-12 RX ADMIN — IBUPROFEN 600 MG: 600 TABLET, FILM COATED ORAL at 00:49

## 2023-08-12 RX ADMIN — ACETAMINOPHEN 650 MG: 325 TABLET, FILM COATED ORAL at 08:36

## 2023-08-12 RX ADMIN — LIDOCAINE 1 PATCH: 700 PATCH TOPICAL at 08:34

## 2023-08-12 RX ADMIN — IBUPROFEN 600 MG: 600 TABLET, FILM COATED ORAL at 07:01

## 2023-08-12 RX ADMIN — DOCUSATE SODIUM 100 MG: 100 CAPSULE, LIQUID FILLED ORAL at 08:35

## 2023-08-12 RX ADMIN — ACETAMINOPHEN 650 MG: 325 TABLET, FILM COATED ORAL at 03:54

## 2023-08-12 RX ADMIN — PRENATAL VIT W/ FE FUMARATE-FA TAB 27-0.8 MG 1 TABLET: 27-0.8 TAB at 08:36

## 2023-08-12 NOTE — DISCHARGE SUMMARY
Oneal Adams MD  [facility]  [address]  [place] [id number]  Office [phone]  Fax [phone]    Discharge Summary    [facility]  Megan URIOSTEGUI  : 1990  MRN: [id number]  CSN: [id number]    Date of Admission: 2023   Date of Discharge:  2023   Delivering Physician: Jacqueline Pemberton MD       Admission Diagnosis: 40 weeks gestation of pregnancy [Z3A.40]  40 weeks gestation of pregnancy [Z3A.40]   Discharge Diagnosis: Pregnancy at 40w6d - delivered       Procedures: Primary  (LTCS)     Hospital Course  See the completed operative report for details regarding antepartum course and delivery.    Her postoperative course was unremarkable.  On POD # 4 she felt like she was ready for discharge.  She was evaluated by Dr. Adams who agreed she was able to be discharged to home.  She had no febrile morbidity. She had normal bowel and bladder function and was hemodynamically stable.  Her wound was healing well without obvious signs of infections.    Infant  male  fetus weighing 3930 g (8 lb 10.6 oz)   Apgars -  8 @ 1 minute /  8 @ 5 minutes.    Discharge labs  Lab Results   Component Value Date    WBC 12.76 (H) 2023    HGB 8.6 (L) 2023    HCT 27.3 (L) 2023     (L) 2023       Discharge Medications     Discharge Medications        New Medications        Instructions Start Date   oxyCODONE 5 MG immediate release tablet  Commonly known as: ROXICODONE   5 mg, Oral, Every 6 Hours PRN             Changes to Medications        Instructions Start Date   buPROPion  MG 24 hr tablet  Commonly known as: Wellbutrin XL  What changed: when to take this   150 mg, Oral, Every Morning             Continue These Medications        Instructions Start Date   famotidine 10 MG tablet  Commonly known as: PEPCID   10 mg, Oral, 2 Times Daily      hydrOXYzine 10 MG tablet  Commonly known as: ATARAX   Take 1 tablet by mouth  Every Night.      Magnesium 500 MG capsule   Oral      ondansetron 8 MG tablet  Commonly known as: ZOFRAN   TAKE 1 TABLET BY MOUTH IN THE MORNING AND AT BEDTIME AS NEEDED FOR NAUSEA      polyethylene glycol 17 GM/SCOOP powder  Commonly known as: MIRALAX   17 g, Oral, Daily      Prenatal 27-1 27-1 MG tablet tablet   Every 24 Hours      sertraline 50 MG tablet  Commonly known as: ZOLOFT   50 mg, Oral, Nightly      vitamin D3 125 MCG (5000 UT) capsule capsule   5,000 Units, Oral, Daily             Stop These Medications      docusate sodium 250 MG capsule  Commonly known as: COLACE     folic acid 1 MG tablet  Commonly known as: FOLVITE     promethazine 12.5 MG tablet  Commonly known as: PHENERGAN              External Prenatal Results       Pregnancy Outside Results - Transcribed From Office Records - See Scanned Records For Details       Test Value Date Time    ABO  O  08/06/23 1716    Rh  Positive  08/06/23 1716    Antibody Screen  Negative  08/06/23 1716       Negative  12/21/22 0821    Varicella IgG       Rubella  3.24 index 12/21/22 0821    Hgb  8.6 g/dL 08/09/23 0605       11.8 g/dL 08/06/23 1716       11.6 g/dL 05/11/23 1027       12.2 g/dL 12/21/22 0821    Hct  27.3 % 08/09/23 0605       36.5 % 08/06/23 1716       37.0 % 12/21/22 0821    Glucose Fasting GTT       Glucose Tolerance Test 1 hour       Glucose Tolerance Test 3 hour       Gonorrhea (discrete)  Negative  07/11/23 1509       Negative  12/21/22 0821    Chlamydia (discrete)  Negative  07/11/23 1509       Negative  12/21/22 0821    RPR  Non Reactive  12/21/22 0821    VDRL       Syphilis Antibody       HBsAg  Negative  12/21/22 0821    Herpes Simplex Virus PCR       Herpes Simplex VIrus Culture       HIV  Non Reactive  12/21/22 0821    Hep C RNA Quant PCR       Hep C Antibody  <0.1 s/co ratio 12/21/22 0821    AFP       Group B Strep  Negative  07/11/23 1509    GBS Susceptibility to Clindamycin       GBS Susceptibility to Erythromycin       Fetal  Fibronectin       Genetic Testing, Maternal Blood                 Drug Screening       Test Value Date Time    Urine Drug Screen       Amphetamine Screen       Barbiturate Screen       Benzodiazepine Screen       Methadone Screen       Phencyclidine Screen       Opiates Screen       THC Screen       Cocaine Screen       Propoxyphene Screen       Buprenorphine Screen       Methamphetamine Screen       Oxycodone Screen       Tricyclic Antidepressants Screen                 Legend    ^: Historical                            Discharge Disposition Home or Self Care   Condition on Discharge: good   Follow-up: 2 weeks with Bryan Adams MD  8/12/2023

## 2023-08-12 NOTE — PROGRESS NOTES
Oneal Adams MD  [facility]  [address]  [place] [address]  Office [phone]  Fax [phone]      [facility]   PROGRESS NOTE    Post-Op Day 4 S/P   Subjective     Patient reports:  Pain is well controlled with ERAS protocol.  She is ambulating. Tolerating diet. Voiding - without difficulty; flatus reported..  Vaginal bleeding is as much as expected. Infant in room now.      Objective      Vitals: Vital Signs Range for the last 24 hours  Temperature: Temp:  [97.2 øF (36.2 øC)-97.9 øF (36.6 øC)] 97.7 øF (36.5 øC)   Temp Source: Temp src: Oral   BP: BP: (117-123)/(78-82) 121/78   Pulse: Heart Rate:  [50-72] 50   Respirations: Resp:  [16-18] 16   SPO2: SpO2:  [98 %-99 %] 98 %   O2 Amount (l/min):     O2 Devices Device (Oxygen Therapy): room air   Weight:              Physical Exam    Lungs clear to auscultation bilaterally   Abdomen Soft, approp tender   Incision  healing well, no erythema, no swelling, well approximated   Extremities edema 1+ and Homans sign is negative, no sign of DVT     None  Lab Results (last 24 hours)       ** No results found for the last 24 hours. **            External Prenatal Results       Pregnancy Outside Results - Transcribed From Office Records - See Scanned Records For Details       Test Value Date Time    ABO  O  23 1716    Rh  Positive  23 1716    Antibody Screen  Negative  23 1716       Negative  22 0821    Varicella IgG       Rubella  3.24 index 22 0821    Hgb  8.6 g/dL 23 0605       11.8 g/dL 23 1716       11.6 g/dL 23 1027       12.2 g/dL 22 0821    Hct  27.3 % 23 0605       36.5 % 23 1716       37.0 % 22 0821    Glucose Fasting GTT       Glucose Tolerance Test 1 hour       Glucose Tolerance Test 3 hour       Gonorrhea (discrete)  Negative  23 1509       Negative  22 0821    Chlamydia (discrete)  Negative  23 1509       Negative  22 0821     RPR  Non Reactive  22    VDRL       Syphilis Antibody       HBsAg  Negative  22    Herpes Simplex Virus PCR       Herpes Simplex VIrus Culture       HIV  Non Reactive  22    Hep C RNA Quant PCR       Hep C Antibody  <0.1 s/co ratio 22    AFP       Group B Strep  Negative  23 1509    GBS Susceptibility to Clindamycin       GBS Susceptibility to Erythromycin       Fetal Fibronectin       Genetic Testing, Maternal Blood                 Drug Screening       Test Value Date Time    Urine Drug Screen       Amphetamine Screen       Barbiturate Screen       Benzodiazepine Screen       Methadone Screen       Phencyclidine Screen       Opiates Screen       THC Screen       Cocaine Screen       Propoxyphene Screen       Buprenorphine Screen       Methamphetamine Screen       Oxycodone Screen       Tricyclic Antidepressants Screen                 Legend    ^: Historical                            Assessment & Plan        * No active hospital problems. *      Assessment:    Megan URIOSTEGUI is Day 4  post-partum    , Low Transverse     .       Plan:  plan for discharge today.        Oneal Adams MD  2023  09:45 CDT

## 2023-08-12 NOTE — DISCHARGE INSTRUCTIONS
PLEASE KEEP, READ AND REFER BACK TO YOUR POSTPARTUM AND  CARE BOOKLET WITH QUESTIONS OR CONCERNS. YOUR DOCTORS ARE ALWAYS AVAILABLE WITH QUESTIONS OR CONCERNS BY CALLING THEIR OFFICE NUMBERS.

## 2023-08-12 NOTE — PLAN OF CARE
Goal Outcome Evaluation:  Plan of Care Reviewed With: patient        Progress: improving  Outcome Evaluation: VSS, ff ml u1 scant lochia, alt incision with dermabond cdi, +2 edema noted in lower extremitites, passing flatus, ambulating and voiding without difficulty, iv intact, infant is rooming in patient's room

## 2023-08-12 NOTE — PLAN OF CARE
Goal Outcome Evaluation:           Progress: improving  Outcome Evaluation: VSS FF/ML/U1 scant lochia. ALT c/d/i with dermabond and binder. Voiding; Ambulating; ERAS continues. Ultram x 2 . Bonding with infant

## 2023-08-12 NOTE — PLAN OF CARE
Goal Outcome Evaluation:      Vss, voiding painlessly and without difficulty, bonding with baby, breastfeeding on demand, incision is clean dry and intact, scant lochia rubra, ready for discharge.

## 2023-08-22 ENCOUNTER — POSTPARTUM VISIT (OUTPATIENT)
Dept: OBSTETRICS AND GYNECOLOGY | Facility: CLINIC | Age: 33
End: 2023-08-22
Payer: COMMERCIAL

## 2023-08-22 VITALS
BODY MASS INDEX: 27.99 KG/M2 | DIASTOLIC BLOOD PRESSURE: 64 MMHG | WEIGHT: 168 LBS | SYSTOLIC BLOOD PRESSURE: 126 MMHG | HEIGHT: 65 IN

## 2023-08-22 PROCEDURE — 0503F POSTPARTUM CARE VISIT: CPT | Performed by: ADVANCED PRACTICE MIDWIFE

## 2023-08-22 NOTE — PROGRESS NOTES
"Subjective   Chief Complaint   Patient presents with    Postpartum Care      2023. Pt states that she Is here to discuss ppd. Pt states that she is not wanting to take a shower or eat. Pt states that she Is feeling more anxious than usual.     Megan URIOSTEGUI is a 32 y.o. year old  presenting to be seen for her postpartum visit.  She had a Primary  (LTCS).   Prenatal course was benign.     Since delivery she has not been sexually active.  She does have concerns about post-partum blues/depression. She has increased feelings of anxiety and also not feeling \"right.\" She states she does not feel like showering or eating. Does not have thoughts or plans to harm herself. She is unsure if she needs her medications adjusted. She questions as to if the dose needs increased. She does not desire to change medications possible.   She is breastfeeding.    The following portions of the patient's history were reviewed and updated as appropriate:problem list, current medications, and allergies    Social History     Socioeconomic History    Marital status:      Spouse name: Liam   Tobacco Use    Smoking status: Never     Passive exposure: Never    Smokeless tobacco: Never   Vaping Use    Vaping Use: Never used   Substance and Sexual Activity    Alcohol use: Not Currently     Alcohol/week: 1.0 standard drink     Types: 1 Glasses of wine per week    Drug use: Never    Sexual activity: Yes     Partners: Male     Birth control/protection: None     Review of Systems   Constitutional:  Negative for chills, fatigue and fever.   Respiratory:  Negative for chest tightness and shortness of breath.    Cardiovascular:  Negative for chest pain, palpitations and leg swelling.   Gastrointestinal:  Negative for abdominal pain and constipation.   Genitourinary:  Positive for vaginal bleeding. Negative for decreased urine volume, difficulty urinating, dysuria, flank pain, frequency, pelvic pain, urgency, vaginal " "discharge and vaginal pain.   Musculoskeletal:  Negative for gait problem.   Skin:  Positive for wound ( section incision). Negative for pallor and rash.   Allergic/Immunologic: Negative for environmental allergies, food allergies and immunocompromised state.   Neurological:  Negative for dizziness, light-headedness and headaches.   Hematological:  Negative for adenopathy.   Psychiatric/Behavioral:  Positive for dysphoric mood. Negative for self-injury and suicidal ideas. The patient is nervous/anxious.        Objective   /64   Ht 165.1 cm (65\")   Wt 76.2 kg (168 lb)   LMP 10/25/2022   Breastfeeding Yes Comment: supplementing with formula  BMI 27.96 kg/mý     General:  well developed; well nourished  no acute distress   Abdomen: soft, non-tender; no masses  incision is healing, clean, dry, intact, without drainage, and skin glue dressing intact   Pelvis: Not performed.          Diagnoses and all orders for this visit:    1. Postpartum depression (Primary)  - EPDS screening today, score of 11. Discussed with patient measures of support, including healthy support system, taking time for self, relaxation measures (mindfulness, aromatherapy, meditation) counseling,  genetic testing  with regards to mental health medications, and changes to current prescription.  Will increase Zoloft at this time.  Discussed signs to report and reasons to come to the hospital. Patient is willing to talk with a counselor, so referral placed.  Mood and Anxiety Disorder and Postpartum Emotions video included in the AVS.   - Return to the office in one week for surveillance of mood and as needed with concerns.   -     sertraline (ZOLOFT) 50 MG tablet; Take 2 tablets by mouth Every Night.  Dispense: 30 tablet; Refill: 3  -     Ambulatory Referral to Psychology    2. Postpartum care following  delivery  - Postpartum exam. Discussed healthy lifestyle measures, including healthy diet, adequate hydration, " exercise, and sleep hygiene measures. Encouraged to resume regular activities. Release for work/school without restrictions.         This note was electronically signed.    Tayler Chahal, MAGDA, APRN, CNM, RNC-OB  August 22, 2023

## 2023-08-29 ENCOUNTER — POSTPARTUM VISIT (OUTPATIENT)
Dept: OBSTETRICS AND GYNECOLOGY | Facility: CLINIC | Age: 33
End: 2023-08-29
Payer: COMMERCIAL

## 2023-08-29 VITALS
HEIGHT: 65 IN | BODY MASS INDEX: 27.49 KG/M2 | SYSTOLIC BLOOD PRESSURE: 130 MMHG | WEIGHT: 165 LBS | DIASTOLIC BLOOD PRESSURE: 72 MMHG

## 2023-08-29 DIAGNOSIS — Z09 POSTOP CHECK: ICD-10-CM

## 2023-08-29 DIAGNOSIS — N89.8 VAGINAL DISCHARGE: ICD-10-CM

## 2023-08-29 PROCEDURE — 0503F POSTPARTUM CARE VISIT: CPT | Performed by: ADVANCED PRACTICE MIDWIFE

## 2023-08-29 RX ORDER — BUPROPION HYDROCHLORIDE 150 MG/1
300 TABLET ORAL EVERY MORNING
Qty: 60 TABLET | Refills: 2 | Status: SHIPPED | OUTPATIENT
Start: 2023-08-29 | End: 2024-08-28

## 2023-08-29 NOTE — PROGRESS NOTES
"Subjective   Chief Complaint   Patient presents with    Postpartum Care     Pt is here today to discuss increasing her wellbutrin for ppd. Pt also c/o vaginal discharge and odor.      Megan URIOSTEGUI is a 32 y.o. year old  presenting to be seen for her postpartum visit.  She had a Primary  (LTCS).   Prenatal course was benign. Postpartum she has had increased mood changes.     Since delivery she has not been sexually active.  She does have concerns about post-partum blues/depression.   She is  breastfeeding .    The following portions of the patient's history were reviewed and updated as appropriate:problem list, current medications, and allergies    Social History     Socioeconomic History    Marital status:      Spouse name: Liam   Tobacco Use    Smoking status: Never     Passive exposure: Never    Smokeless tobacco: Never   Vaping Use    Vaping Use: Never used   Substance and Sexual Activity    Alcohol use: Not Currently     Alcohol/week: 1.0 standard drink     Types: 1 Glasses of wine per week    Drug use: Never    Sexual activity: Yes     Partners: Male     Birth control/protection: None     Review of Systems   Constitutional:  Negative for chills and fever.   HENT:  Negative for sore throat.    Gastrointestinal:  Negative for abdominal pain, constipation, diarrhea, nausea and vomiting.   Genitourinary:  Positive for vaginal discharge (with odor). Negative for dysuria, flank pain, frequency, hematuria, pelvic pain and urgency.   Musculoskeletal:  Negative for back pain.   Skin:  Positive for wound. Negative for rash.   Neurological:  Negative for headaches.   Psychiatric/Behavioral:  Negative for dysphoric mood, self-injury and suicidal ideas. The patient is nervous/anxious.        Objective   /72   Ht 165.1 cm (65\")   Wt 74.8 kg (165 lb)   Breastfeeding Yes Comment: supplementing with formula.  BMI 27.46 kg/m²     General:  well developed; well nourished  no acute distress "   Abdomen: incision is healing, clean, dry, intact, and without drainage  soft, non-tender; bowel sounds normal; no masses, no organomegaly   Pelvis: Clinical staff was present for exam  External genitalia:  normal appearance of the external genitalia including Bartholin's and Yorkana's glands.  Vaginal:  normal pink mucosa without prolapse or lesions. discharge present -  white-gray          Diagnoses and all orders for this visit:    1. Postpartum care following  delivery (Primary)  - Postpartum exam. Discussed healthy lifestyle measures, including healthy diet, adequate hydration, activity as tolerated, and sleep hygiene measures. Reviewed postpartum signs to report.  - Return to office as scheduled for 6-week postpartum assessment and as needed with concerns.     2. Postop check  - Discussed keeping incision site clean and dry. Discussed home post  self-care and signs to report.   - Return to office as scheduled for 6-week postpartum assessment and as needed with concerns.    3. Depression, postpartum  - EPDS screening today, score of 3. Discussed on healthy lifestyle measures to improve or support mental well-being, including taking time for self, accessing healthy support system, relaxation measures, sleep hygiene measures, rest as indicated, exercise, warm shower, aromatherapy, massage therapy, supplements, and counseling. Encouraged to call or return to the office with concerns of anxiety or depression. Discussed with patient increasing dose and prescription sent to pharmacy.  - Return to office as scheduled for 6-week postpartum appointment to assess concerns.   -     buPROPion XL (Wellbutrin XL) 150 MG 24 hr tablet; Take 2 tablets by mouth Every Morning.  Dispense: 60 tablet; Refill: 2    4. Vaginal discharge  - Culture today. Reviewed vaginal health and measures of support. Will notify patient of the results.   -     Clark Vaginitis (VG) - Swab, Vagina        This note was electronically  signed.    Tayler Chahal, DNP, APRN, CNM, RNC-OB  08/29/2023

## 2023-08-31 LAB
A VAGINAE DNA VAG QL NAA+PROBE: NORMAL SCORE
BVAB2 DNA VAG QL NAA+PROBE: NORMAL SCORE
C ALBICANS DNA VAG QL NAA+PROBE: NEGATIVE
C GLABRATA DNA VAG QL NAA+PROBE: NEGATIVE
MEGA1 DNA VAG QL NAA+PROBE: NORMAL SCORE
T VAGINALIS DNA VAG QL NAA+PROBE: NEGATIVE

## 2023-09-20 ENCOUNTER — POSTPARTUM VISIT (OUTPATIENT)
Dept: OBSTETRICS AND GYNECOLOGY | Facility: CLINIC | Age: 33
End: 2023-09-20

## 2023-09-20 VITALS
DIASTOLIC BLOOD PRESSURE: 60 MMHG | SYSTOLIC BLOOD PRESSURE: 110 MMHG | WEIGHT: 162 LBS | HEIGHT: 65 IN | BODY MASS INDEX: 26.99 KG/M2

## 2023-09-20 DIAGNOSIS — Z30.02 COUNSELING FOR BIRTH CONTROL, NATURAL FAMILY PLANNING: ICD-10-CM

## 2023-09-20 PROBLEM — O35.8XX0 UMBILICAL VEIN ABNORMALITY AFFECTING PREGNANCY: Status: RESOLVED | Noted: 2023-03-14 | Resolved: 2023-09-20

## 2023-09-20 LAB
B-HCG UR QL: NEGATIVE
EXPIRATION DATE: NORMAL
INTERNAL NEGATIVE CONTROL: NEGATIVE
INTERNAL POSITIVE CONTROL: POSITIVE
Lab: NORMAL

## 2023-09-20 NOTE — PROGRESS NOTES
"Subjective   Chief Complaint   Patient presents with    Postpartum Care     6 week pp care.      Megan URIOSTEGUI is a 33 y.o. year old  presenting to be seen for her postpartum visit.  She had a Primary  (LTCS).   Prenatal course was benign.    Since delivery she has not been sexually active.  She does not have concerns about post-partum blues/depression.       The following portions of the patient's history were reviewed and updated as appropriate:problem list, current medications, and allergies    Social History     Socioeconomic History    Marital status:      Spouse name: Liam   Tobacco Use    Smoking status: Never     Passive exposure: Never    Smokeless tobacco: Never   Vaping Use    Vaping Use: Never used   Substance and Sexual Activity    Alcohol use: Not Currently     Alcohol/week: 1.0 standard drink     Types: 1 Glasses of wine per week    Drug use: Never    Sexual activity: Yes     Partners: Male     Birth control/protection: None     Review of Systems   Constitutional:  Negative for chills, fatigue and fever.   Respiratory:  Negative for chest tightness and shortness of breath.    Cardiovascular:  Negative for chest pain, palpitations and leg swelling.   Gastrointestinal:  Negative for abdominal pain and constipation.   Genitourinary:  Negative for decreased urine volume, difficulty urinating, dysuria, flank pain, frequency, pelvic pain, urgency, vaginal bleeding, vaginal discharge and vaginal pain.   Musculoskeletal:  Negative for gait problem.   Skin:  Negative for pallor, rash and wound.   Allergic/Immunologic: Negative for environmental allergies, food allergies and immunocompromised state.   Neurological:  Negative for dizziness, light-headedness and headaches.   Hematological:  Negative for adenopathy.   Psychiatric/Behavioral:  Negative for dysphoric mood, self-injury and suicidal ideas. The patient is not nervous/anxious.        Objective   /60   Ht 165.1 cm (65\") "   Wt 73.5 kg (162 lb)   Breastfeeding No   BMI 26.96 kg/m²     General:  well developed; well nourished  no acute distress   Abdomen: incision is healed  soft, non-tender; bowel sounds normal; no masses, no organomegaly   Pelvis: Not performed.     Last Pap smear: 01/07/2020 NILM (ECTZ present)       Diagnoses and all orders for this visit:    1. 6 weeks postpartum follow-up (Primary)  - Postpartum exam. Discussed healthy lifestyle measures, including healthy diet, adequate hydration, exercise, and sleep hygiene measures. Encouraged to resume regular activities. Release for work/school without restrictions. BMI for Adults education included in the AVS.  - Return to office in 3 months for an annual well woman exam and as needed with concerns.   -     POC Pregnancy, Urine    2. Depression, postpartum  - EPDS screening today, score of 0. Discussed on healthy lifestyle measures to improve or support mental well-being, including taking time for self, accessing healthy support system, relaxation measures, sleep hygiene measures, rest as indicated, exercise, warm epsom salt bath or shower, aromatherapy, massage therapy, supplements, and counseling. Encouraged to call or return to the office with concerns of anxiety or depression.    3. Counseling for birth control, natural family planning  - With recent mood concerns, patient does not desire to utilize a hormonal method of contraception at this time. She is aware of natural family planning practices and will use this method at this time. Natural Family Planning education and video included in the AVS.        This note was electronically signed.    Tayler Chahal, DNP, APRN, CNM, RNC-OB  9/20/2023

## 2023-10-09 RX ORDER — BUPROPION HYDROCHLORIDE 150 MG/1
300 TABLET ORAL EVERY MORNING
Qty: 180 TABLET | OUTPATIENT
Start: 2023-10-09 | End: 2024-10-08

## 2023-11-27 RX ORDER — BUPROPION HYDROCHLORIDE 150 MG/1
300 TABLET ORAL EVERY MORNING
Qty: 180 TABLET | Refills: 3 | Status: SHIPPED | OUTPATIENT
Start: 2023-11-27 | End: 2024-11-26

## 2024-05-08 ENCOUNTER — TELEPHONE (OUTPATIENT)
Dept: OBSTETRICS AND GYNECOLOGY | Age: 34
End: 2024-05-08

## 2024-05-08 NOTE — TELEPHONE ENCOUNTER
Caller: Megan URIOSTEGUI    Relationship to patient: Self    Best call back number: [phone] (home)        PT RESCHEDULED TODAY'S APPT@45 KM [phone]@1.

## 2024-06-25 ENCOUNTER — OFFICE VISIT (OUTPATIENT)
Dept: OBSTETRICS AND GYNECOLOGY | Age: 34
End: 2024-06-25
Payer: COMMERCIAL

## 2024-06-25 VITALS
SYSTOLIC BLOOD PRESSURE: 110 MMHG | BODY MASS INDEX: 24.16 KG/M2 | WEIGHT: 145 LBS | DIASTOLIC BLOOD PRESSURE: 70 MMHG | HEIGHT: 65 IN

## 2024-06-25 DIAGNOSIS — Z01.419 WELL WOMAN EXAM WITH ROUTINE GYNECOLOGICAL EXAM: Primary | ICD-10-CM

## 2024-06-25 DIAGNOSIS — R23.2 HOT FLASHES: ICD-10-CM

## 2024-06-25 DIAGNOSIS — G43.919 INTRACTABLE MIGRAINE WITHOUT STATUS MIGRAINOSUS, UNSPECIFIED MIGRAINE TYPE: ICD-10-CM

## 2024-06-25 DIAGNOSIS — R61 NIGHT SWEATS: ICD-10-CM

## 2024-06-25 DIAGNOSIS — Z30.09 ENCOUNTER FOR COUNSELING REGARDING CONTRACEPTION: ICD-10-CM

## 2024-06-25 DIAGNOSIS — R68.82 LOW LIBIDO: ICD-10-CM

## 2024-06-25 DIAGNOSIS — Z12.4 ENCOUNTER FOR SCREENING FOR CERVICAL CANCER: ICD-10-CM

## 2024-06-25 DIAGNOSIS — E55.9 VITAMIN D DEFICIENCY: ICD-10-CM

## 2024-06-25 PROCEDURE — 99395 PREV VISIT EST AGE 18-39: CPT

## 2024-06-25 PROCEDURE — 87624 HPV HI-RISK TYP POOLED RSLT: CPT

## 2024-06-25 PROCEDURE — 99213 OFFICE O/P EST LOW 20 MIN: CPT

## 2024-06-25 PROCEDURE — 99459 PELVIC EXAMINATION: CPT

## 2024-06-25 PROCEDURE — G0123 SCREEN CERV/VAG THIN LAYER: HCPCS

## 2024-06-25 RX ORDER — ERGOCALCIFEROL 1.25 MG/1
50000 CAPSULE ORAL WEEKLY
Qty: 30 CAPSULE | Refills: 1 | Status: SHIPPED | OUTPATIENT
Start: 2024-06-25

## 2024-06-25 RX ORDER — RIZATRIPTAN BENZOATE 5 MG/1
5 TABLET ORAL ONCE AS NEEDED
Qty: 30 TABLET | Refills: 2 | Status: SHIPPED | OUTPATIENT
Start: 2024-06-25 | End: 2024-07-25

## 2024-06-25 RX ORDER — FLUOXETINE HYDROCHLORIDE 20 MG/1
1 CAPSULE ORAL DAILY
COMMUNITY
Start: 2024-04-24

## 2024-06-25 NOTE — PROGRESS NOTES
"Subjective     Megan URIOSTEGUI is a 33 y.o. female    History of Present Illness  The patient presents to the office today for her annual well woman examination. The patient has been experiencing night sweats, frequent migraines throughout the month, states they are worse the week of her period. She notes a low libido and insomnia occurring after delivery of her child. She would like to discuss checking hormones to rule this out as a source of her symptoms. She is not on any birth control currently and does not wish to be.   Gynecologic Exam  The patient's pertinent negatives include no genital itching, genital lesions, genital odor, genital rash, missed menses, pelvic pain, vaginal bleeding or vaginal discharge. The patient is experiencing no pain. Associated symptoms include headaches. Pertinent negatives include no abdominal pain, anorexia, back pain, chills, constipation, diarrhea, discolored urine, dysuria, fever, flank pain, frequency, hematuria, joint pain, joint swelling, nausea, painful intercourse, rash, sore throat, urgency or vomiting. She is sexually active. No, her partner does not have an STD. She uses nothing for contraception. Her menstrual history has been regular. The maximum temperature recorded prior to her arrival was no fever.         /70   Ht 165.1 cm (65\")   Wt 65.8 kg (145 lb)   LMP 06/06/2024 (Exact Date)   Breastfeeding No   BMI 24.13 kg/m²     Outpatient Encounter Medications as of 6/25/2024   Medication Sig Dispense Refill    buPROPion XL (WELLBUTRIN XL) 150 MG 24 hr tablet TAKE 2 TABLETS BY MOUTH EVERY MORNING 180 tablet 3    FLUoxetine (PROzac) 20 MG capsule Take 1 capsule by mouth Daily. Not taking yet      hydrOXYzine (ATARAX) 10 MG tablet Take 1 tablet by mouth Every Night.      polyethylene glycol (MIRALAX) 17 GM/SCOOP powder Take 17 g by mouth Daily.      Prenatal Vit-Fe Fumarate-FA (Prenatal 27-1) 27-1 MG tablet tablet Daily.      sertraline (ZOLOFT) 50 MG tablet " TAKE 2 TABLETS BY MOUTH EVERY NIGHT 180 tablet 1    [DISCONTINUED] vitamin D3 125 MCG (5000 UT) capsule capsule Take 1 capsule by mouth Daily.      rizatriptan (MAXALT) 5 MG tablet Take 1 tablet by mouth 1 (One) Time As Needed for Migraine for up to 30 days. May repeat in 2 hours if needed 30 tablet 2    vitamin D (ERGOCALCIFEROL) 1.25 MG (26348 UT) capsule capsule Take 1 capsule by mouth 1 (One) Time Per Week. 30 capsule 1     No facility-administered encounter medications on file as of 2024.       Past Medical History  Past Medical History:   Diagnosis Date    Anxiety     Asthma As a child    Depression     Female infertility 2020       Surgical History  Past Surgical History:   Procedure Laterality Date     SECTION N/A 2023    Procedure:  SECTION PRIMARY;  Surgeon: Jacqueline Pemberton MD;  Location: [facility] LABOR DELIVERY;  Service: Obstetrics/Gynecology;  Laterality: N/A;       Family History  Family History   Problem Relation Age of Onset    No Known Problems Father     No Known Problems Mother     No Known Problems Sister     No Known Problems Sister     No Known Problems Paternal Grandfather     Lymphoma Maternal Grandmother     No Known Problems Maternal Grandfather     Breast cancer Neg Hx     Ovarian cancer Neg Hx     Uterine cancer Neg Hx     Colon cancer Neg Hx     Melanoma Neg Hx        The following portions of the patient's history were reviewed and updated as appropriate: allergies, current medications, past family history, past medical history, past social history, past surgical history, and problem list.    Review of Systems   Constitutional: Negative.  Negative for chills and fever.   HENT:  Negative for sore throat.    Eyes: Negative.    Respiratory: Negative.     Cardiovascular: Negative.    Gastrointestinal: Negative.  Negative for abdominal pain, anorexia, constipation, diarrhea, nausea and vomiting.   Endocrine: Positive for heat intolerance.   Genitourinary:   Positive for decreased libido. Negative for dyspareunia, dysuria, flank pain, frequency, hematuria, menstrual problem, missed menses, pelvic pain, urgency, vaginal bleeding, vaginal discharge and vaginal pain.   Musculoskeletal: Negative.  Negative for back pain and joint pain.   Skin: Negative.  Negative for rash.   Allergic/Immunologic: Negative.    Hematological: Negative.    Psychiatric/Behavioral: Negative.         Objective   Physical Exam  Vitals and nursing note reviewed. Exam conducted with a chaperone present.   Constitutional:       General: She is not in acute distress.     Appearance: She is well-developed. She is not diaphoretic.   HENT:      Head: Normocephalic.      Right Ear: External ear normal.      Left Ear: External ear normal.      Nose: Nose normal.   Eyes:      General: No scleral icterus.        Right eye: No discharge.         Left eye: No discharge.      Conjunctiva/sclera: Conjunctivae normal.      Pupils: Pupils are equal, round, and reactive to light.   Neck:      Thyroid: No thyromegaly.      Vascular: No carotid bruit.      Trachea: No tracheal deviation.   Cardiovascular:      Rate and Rhythm: Normal rate and regular rhythm.      Pulses: Normal pulses.      Heart sounds: Normal heart sounds. No murmur heard.  Pulmonary:      Effort: Pulmonary effort is normal. No respiratory distress.      Breath sounds: Normal breath sounds. No wheezing.   Chest:   Breasts:     Breasts are symmetrical.      Right: Normal. No swelling, bleeding, inverted nipple, mass, nipple discharge, skin change or tenderness.      Left: Normal. No swelling, bleeding, inverted nipple, mass, nipple discharge, skin change or tenderness.   Abdominal:      General: Bowel sounds are normal. There is no distension.      Palpations: Abdomen is soft. There is no mass.      Tenderness: There is no abdominal tenderness. There is no right CVA tenderness, left CVA tenderness or guarding.      Hernia: No hernia is present.  There is no hernia in the left inguinal area or right inguinal area.   Genitourinary:     General: Normal vulva.      Exam position: Lithotomy position.      Labia:         Right: No rash, tenderness, lesion or injury.         Left: No rash, tenderness, lesion or injury.       Vagina: Normal. No signs of injury and foreign body. No vaginal discharge, erythema, tenderness or bleeding.      Cervix: Normal.      Uterus: Normal. Not enlarged, not fixed and not tender.       Adnexa: Right adnexa normal and left adnexa normal.        Right: No mass, tenderness or fullness.          Left: No mass, tenderness or fullness.        Rectum: Normal. No mass.      Comments: BSU normal  Urethral meatus  Normal  Perineum  Normal  Musculoskeletal:         General: No tenderness. Normal range of motion.      Cervical back: Normal range of motion and neck supple.   Lymphadenopathy:      Head:      Right side of head: No submental, submandibular, tonsillar, preauricular, posterior auricular or occipital adenopathy.      Left side of head: No submental, submandibular, tonsillar, preauricular, posterior auricular or occipital adenopathy.      Cervical: No cervical adenopathy.      Right cervical: No superficial, deep or posterior cervical adenopathy.     Left cervical: No superficial, deep or posterior cervical adenopathy.      Upper Body:      Right upper body: No supraclavicular, axillary or pectoral adenopathy.      Left upper body: No supraclavicular, axillary or pectoral adenopathy.      Lower Body: No right inguinal adenopathy. No left inguinal adenopathy.   Skin:     General: Skin is warm and dry.      Findings: No bruising, erythema or rash.   Neurological:      Mental Status: She is alert and oriented to person, place, and time.      Coordination: Coordination normal.   Psychiatric:         Mood and Affect: Mood normal.         Behavior: Behavior normal.         Thought Content: Thought content normal.         Judgment: Judgment  normal.         PHQ-9 Depression Screening  Little interest or pleasure in doing things? 0-->not at all   Feeling down, depressed, or hopeless? 0-->not at all   Trouble falling or staying asleep, or sleeping too much?     Feeling tired or having little energy?     Poor appetite or overeating?     Feeling bad about yourself - or that you are a failure or have let yourself or your family down?     Trouble concentrating on things, such as reading the newspaper or watching television?     Moving or speaking so slowly that other people could have noticed? Or the opposite - being so fidgety or restless that you have been moving around a lot more than usual?     Thoughts that you would be better off dead, or of hurting yourself in some way?     PHQ-9 Total Score 0   If you checked off any problems, how difficult have these problems made it for you to do your work, take care of things at home, or get along with other people?          Assessment & Plan   Diagnoses and all orders for this visit:    1. Well woman exam with routine gynecological exam (Primary)  Comments:  The patient presents to the office today for her annual well woman examination. The patient is established with her pcp and screening lab work will be completed today. The patient does have regular menses with her LMP noted on 6/6/2024.   Orders:  -     CBC & Differential  -     Comprehensive Metabolic Panel  -     Lipid Panel With LDL / HDL Ratio  -     TSH  -     T3, Uptake  -     T4, Free  -     Hemoglobin A1c  -     UA / M With / Rflx Culture([facility] ONLY) - Urine, Clean Catch  -     Hepatitis C Antibody    2. Encounter for screening for cervical cancer  Comments:  The patient is sexually active and declines std screening. Negative pap smear history. Pelvic exam with pap smear completed today.  Orders:  -     Liquid-based Pap Smear, Screening    3. Vitamin D deficiency  Comments:  Patient wishes to resume oral Vitamin D supplementation as she previously  was noted to be deficient in this. Refill sent to pharmacy.  Orders:  -     Vitamin D,25-Hydroxy    4. Low libido  Comments:  Patient is PP and currently on Zoloft and Wellbutrin XL. Discussed that this can be a common side effect of this medication. Patient requests hormone lab work today. She will be notified of the results.  Orders:  -     Cortisol  -     DHEA-Sulfate  -     Estradiol  -     Follicle Stimulating Hormone  -     Luteinizing Hormone  -     Progesterone  -     Testosterone    5. Hot flashes  Comments:  Patient is PP and currently on Zoloft and Wellbutrin XL. Discussed that this can be a common side effect of this medication. Patient requests hormone lab work today. She will be notified of the results.   Orders:  -     Cortisol  -     DHEA-Sulfate  -     Estradiol  -     Follicle Stimulating Hormone  -     Luteinizing Hormone  -     Progesterone  -     Testosterone    6. Night sweats  Comments:  Patient is PP and currently on Zoloft and Wellbutrin XL. Discussed that this can be a common side effect of this medication. Patient requests hormone lab work today. She will be notified of the results.  Orders:  -     Cortisol  -     DHEA-Sulfate  -     Estradiol  -     Follicle Stimulating Hormone  -     Luteinizing Hormone  -     Progesterone  -     Testosterone    7. Intractable migraine without status migrainosus, unspecified migraine type  Comments:  Patient reports an increase in migraine headache with light sensitivity the week before her cycle starting and sometimes the last two days of her cycle. She has tried use of Ibuprofen and Benadryl to obtain relief but this causes severe drowsiness. Will start prn use of Maxalt for migraine headache treatment and will recheck tolerance of new medicine in one month.   Orders:  -     rizatriptan (MAXALT) 5 MG tablet; Take 1 tablet by mouth 1 (One) Time As Needed for Migraine for up to 30 days. May repeat in 2 hours if needed  Dispense: 30 tablet; Refill:  2    8. Encounter for counseling regarding contraception  Comments:  The patient does wish to discuss pregnancy prevention but is hestitant to use contraception for this. We discussed use of the Paragard or Mirena IUD as an option. Mirena IUD pamphlet provided to the patient today. She will think about this option and notify us if she wishes to proceed with ordering and setting up appointment for insertion.        Normal GYN exam. Encouraged SBE, pt is aware how to do self breast exam and the importance of same. Discussed weight management and importance of maintaining a healthy weight. Discussed Vitamin D intake and the importance of adequate vitamin D for both bone health and a healthy immune system.  Discussed daily exercise and the importance of same, in regards to a healthy heart as well as helping to maintain her weight and improving her mental health.  Pap smear is done per [facility] guidelines. HPV is done. Lab work up is up to date.      BMI is within normal parameters. No other follow-up for BMI required.      Estrellita Molina, APRN  6/25/2024

## 2024-06-26 LAB
25(OH)D3+25(OH)D2 SERPL-MCNC: 61.7 NG/ML (ref 30–100)
ALBUMIN SERPL-MCNC: 5 G/DL (ref 3.5–5.2)
ALBUMIN/GLOB SERPL: 2.1 G/DL
ALP SERPL-CCNC: 71 U/L (ref 39–117)
ALT SERPL-CCNC: 15 U/L (ref 1–33)
APPEARANCE UR: CLEAR
AST SERPL-CCNC: 23 U/L (ref 1–32)
BACTERIA #/AREA URNS HPF: NORMAL /[HPF]
BASOPHILS # BLD AUTO: 0.02 10*3/MM3 (ref 0–0.2)
BASOPHILS NFR BLD AUTO: 0.4 % (ref 0–1.5)
BILIRUB SERPL-MCNC: 0.4 MG/DL (ref 0–1.2)
BILIRUB UR QL STRIP: NEGATIVE
BUN SERPL-MCNC: 9 MG/DL (ref 6–20)
BUN/CREAT SERPL: 11.1 (ref 7–25)
CALCIUM SERPL-MCNC: 9.8 MG/DL (ref 8.6–10.5)
CASTS URNS QL MICRO: NORMAL /LPF
CHLORIDE SERPL-SCNC: 99 MMOL/L (ref 98–107)
CHOLEST SERPL-MCNC: 205 MG/DL (ref 0–200)
CO2 SERPL-SCNC: 28.1 MMOL/L (ref 22–29)
COLOR UR: YELLOW
CORTIS SERPL-MCNC: 7.7 UG/DL (ref 6.2–19.4)
CREAT SERPL-MCNC: 0.81 MG/DL (ref 0.57–1)
DHEA-S SERPL-MCNC: 94.5 UG/DL (ref 84.8–378)
EGFRCR SERPLBLD CKD-EPI 2021: 99.7 ML/MIN/1.73
EOSINOPHIL # BLD AUTO: 0.17 10*3/MM3 (ref 0–0.4)
EOSINOPHIL NFR BLD AUTO: 3.4 % (ref 0.3–6.2)
EPI CELLS #/AREA URNS HPF: NORMAL /HPF (ref 0–10)
ERYTHROCYTE [DISTWIDTH] IN BLOOD BY AUTOMATED COUNT: 13.7 % (ref 12.3–15.4)
ESTRADIOL SERPL-MCNC: 93.9 PG/ML
FSH SERPL-ACNC: 6.8 MIU/ML
GEN CATEG CVX/VAG CYTO-IMP: NORMAL
GLOBULIN SER CALC-MCNC: 2.4 GM/DL
GLUCOSE SERPL-MCNC: 87 MG/DL (ref 65–99)
GLUCOSE UR QL STRIP: NEGATIVE
HBA1C MFR BLD: 5 % (ref 4.8–5.6)
HCT VFR BLD AUTO: 42.7 % (ref 34–46.6)
HCV IGG SERPL QL IA: NON REACTIVE
HDLC SERPL-MCNC: 42 MG/DL (ref 40–60)
HGB BLD-MCNC: 13.7 G/DL (ref 12–15.9)
HGB UR QL STRIP: NEGATIVE
HPV I/H RISK 4 DNA CVX QL PROBE+SIG AMP: NOT DETECTED
IMM GRANULOCYTES # BLD AUTO: 0.01 10*3/MM3 (ref 0–0.05)
IMM GRANULOCYTES NFR BLD AUTO: 0.2 % (ref 0–0.5)
KETONES UR QL STRIP: NEGATIVE
LAB AP CASE REPORT: NORMAL
LAB AP GYN ADDITIONAL INFORMATION: NORMAL
LDLC SERPL CALC-MCNC: 133 MG/DL (ref 0–100)
LDLC/HDLC SERPL: 3.08 {RATIO}
LEUKOCYTE ESTERASE UR QL STRIP: NEGATIVE
LH SERPL-ACNC: 12.9 MIU/ML
LYMPHOCYTES # BLD AUTO: 2.1 10*3/MM3 (ref 0.7–3.1)
LYMPHOCYTES NFR BLD AUTO: 42.1 % (ref 19.6–45.3)
Lab: NORMAL
MCH RBC QN AUTO: 27.7 PG (ref 26.6–33)
MCHC RBC AUTO-ENTMCNC: 32.1 G/DL (ref 31.5–35.7)
MCV RBC AUTO: 86.3 FL (ref 79–97)
MICRO URNS: NORMAL
MICRO URNS: NORMAL
MONOCYTES # BLD AUTO: 0.37 10*3/MM3 (ref 0.1–0.9)
MONOCYTES NFR BLD AUTO: 7.4 % (ref 5–12)
NEUTROPHILS # BLD AUTO: 2.32 10*3/MM3 (ref 1.7–7)
NEUTROPHILS NFR BLD AUTO: 46.5 % (ref 42.7–76)
NITRITE UR QL STRIP: NEGATIVE
NRBC BLD AUTO-RTO: 0 /100 WBC (ref 0–0.2)
PATH INTERP SPEC-IMP: NORMAL
PH UR STRIP: 7 [PH] (ref 5–7.5)
PLATELET # BLD AUTO: 275 10*3/MM3 (ref 140–450)
POTASSIUM SERPL-SCNC: 4.3 MMOL/L (ref 3.5–5.2)
PROGEST SERPL-MCNC: 1.9 NG/ML
PROT SERPL-MCNC: 7.4 G/DL (ref 6–8.5)
PROT UR QL STRIP: NEGATIVE
RBC # BLD AUTO: 4.95 10*6/MM3 (ref 3.77–5.28)
RBC #/AREA URNS HPF: NORMAL /HPF (ref 0–2)
SODIUM SERPL-SCNC: 138 MMOL/L (ref 136–145)
SP GR UR STRIP: 1.02 (ref 1–1.03)
STAT OF ADQ CVX/VAG CYTO-IMP: NORMAL
T3RU NFR SERPL: 28 % (ref 24–39)
T4 FREE SERPL-MCNC: 1.26 NG/DL (ref 0.92–1.68)
TESTOST SERPL-MCNC: 15 NG/DL (ref 8–60)
TRIGL SERPL-MCNC: 169 MG/DL (ref 0–150)
TSH SERPL DL<=0.005 MIU/L-ACNC: 2.03 UIU/ML (ref 0.27–4.2)
URINALYSIS REFLEX: NORMAL
UROBILINOGEN UR STRIP-MCNC: 0.2 MG/DL (ref 0.2–1)
VLDLC SERPL CALC-MCNC: 30 MG/DL (ref 5–40)
WBC # BLD AUTO: 4.99 10*3/MM3 (ref 3.4–10.8)
WBC #/AREA URNS HPF: NORMAL /HPF (ref 0–5)

## 2025-07-01 RX ORDER — ERGOCALCIFEROL 1.25 MG/1
50000 CAPSULE, LIQUID FILLED ORAL WEEKLY
Qty: 12 CAPSULE | Refills: 4 | Status: SHIPPED | OUTPATIENT
Start: 2025-07-01

## 2025-08-01 ENCOUNTER — TELEPHONE (OUTPATIENT)
Dept: OBSTETRICS AND GYNECOLOGY | Age: 35
End: 2025-08-01
Payer: COMMERCIAL

## 2025-08-01 NOTE — TELEPHONE ENCOUNTER
Spoke with YAZMIN Urbina regarding medication prozac and she advised pt is able to restart if she is having concerns at this time. She advises pt may restart and continue until discussion with [facility]ECTOR Lester 8/13 if pt wishes. Pt informed and pt reports she would prefer to speak with HECTOR Lester regarding medication. Pt appt 8/4 cancelled and pt will come for appt 8/13. Pt voices understanding.

## 2025-08-01 NOTE — TELEPHONE ENCOUNTER
Pt calling to report since reporting positive pregnancy test PCP has recommended pt taper off of prozac. Pt reports she has completely stopped taking medication. Pt reports shaking and diarrhea as well as panic attacks today. Pt denies any thoughts of harming self or others. Pt is scheduled for appt 8/4 for discussion of medications during pregnancy and is advised with worsening symptoms to go to ER for evaluation. Pt voices understanding.

## 2025-08-06 ENCOUNTER — OFFICE VISIT (OUTPATIENT)
Age: 35
End: 2025-08-06
Payer: COMMERCIAL

## 2025-08-06 VITALS
SYSTOLIC BLOOD PRESSURE: 104 MMHG | HEIGHT: 65 IN | DIASTOLIC BLOOD PRESSURE: 68 MMHG | WEIGHT: 140 LBS | BODY MASS INDEX: 23.32 KG/M2

## 2025-08-06 DIAGNOSIS — Z32.01 POSITIVE PREGNANCY TEST: Primary | ICD-10-CM

## 2025-08-06 DIAGNOSIS — F32.9 REACTIVE DEPRESSION: ICD-10-CM

## 2025-08-06 DIAGNOSIS — F41.9 ANXIETY: ICD-10-CM

## 2025-08-06 LAB
B-HCG UR QL: POSITIVE
EXPIRATION DATE: ABNORMAL
INTERNAL NEGATIVE CONTROL: NEGATIVE
INTERNAL POSITIVE CONTROL: POSITIVE
Lab: ABNORMAL

## 2025-08-06 RX ORDER — FLUOXETINE 10 MG/1
10 CAPSULE ORAL DAILY
COMMUNITY

## 2025-08-06 RX ORDER — ALPRAZOLAM 0.25 MG
0.25 TABLET ORAL DAILY PRN
COMMUNITY

## 2025-08-06 RX ORDER — BUPROPION HYDROCHLORIDE 300 MG/1
TABLET ORAL
COMMUNITY

## 2025-08-06 RX ORDER — ONDANSETRON 4 MG/1
TABLET, ORALLY DISINTEGRATING ORAL
COMMUNITY

## 2025-08-11 RX ORDER — HYDROXYZINE HYDROCHLORIDE 10 MG/1
10 TABLET, FILM COATED ORAL EVERY 6 HOURS PRN
Qty: 120 TABLET | Refills: 0 | Status: SHIPPED | OUTPATIENT
Start: 2025-08-11

## 2025-08-22 PROBLEM — O34.219 UTERINE SCAR FROM PREVIOUS CESAREAN DELIVERY, ANTEPARTUM: Status: ACTIVE | Noted: 2025-08-22

## 2025-08-22 PROBLEM — Z34.80 PRENATAL CARE, SUBSEQUENT PREGNANCY: Status: ACTIVE | Noted: 2025-08-22

## (undated) DEVICE — TBG PENCL TELESCP MEGADYNE SMOKE EVAC 10FT

## (undated) DEVICE — GLV SURG SENSICARE SLT PF LF 6 STRL

## (undated) DEVICE — CVR HNDL LIGHT RIGID

## (undated) DEVICE — SUT VIC 0 CT1 36IN J946H

## (undated) DEVICE — APPL CHLORAPREP HI/LITE 26ML ORNG

## (undated) DEVICE — LARGE, DISPOSABLE C-SECTION RETRACTOR: Brand: ALEXIS ® O C-SECTION PROTECTOR/RETRACTOR

## (undated) DEVICE — SAFEPICO ASPIRATOR ARTERIAL BLOOD SAMPLER. ASPIRATOR ONLY WITH VENTED TIPCAP, 1.5 ML, BOX OF 100 PCS.: Brand: SAFEPICO ASPIRATOR

## (undated) DEVICE — ANTIBACTERIAL VIOLET BRAIDED (POLYGLACTIN 910), SYNTHETIC ABSORBABLE SUTURE: Brand: COATED VICRYL

## (undated) DEVICE — PAD C-SECTION: Brand: MEDLINE INDUSTRIES, INC.

## (undated) DEVICE — ADHS SKIN PREMIERPRO EXOFIN TOPICAL HI/VISC .5ML

## (undated) DEVICE — PATIENT RETURN ELECTRODE, SINGLE-USE, CONTACT QUALITY MONITORING, ADULT, WITH 15 FT (4.5 M) CORD. FOR PATIENTS WEIGHING OVER 33LBS. (15KG): Brand: MEGADYNE

## (undated) DEVICE — SUT MNCRYL 4/0 PS2 27IN UD MCP426H

## (undated) DEVICE — KENDALL SCD EXPRESS SLEEVES, KNEE LENGTH, LARGE: Brand: KENDALL SCD

## (undated) DEVICE — Device